# Patient Record
Sex: MALE | Race: OTHER | HISPANIC OR LATINO | Employment: FULL TIME | URBAN - METROPOLITAN AREA
[De-identification: names, ages, dates, MRNs, and addresses within clinical notes are randomized per-mention and may not be internally consistent; named-entity substitution may affect disease eponyms.]

---

## 2019-03-28 ENCOUNTER — HISTORICAL (OUTPATIENT)
Dept: ADMINISTRATIVE | Facility: HOSPITAL | Age: 64
End: 2019-03-28

## 2020-01-09 ENCOUNTER — OFFICE VISIT (OUTPATIENT)
Dept: FAMILY MEDICINE | Facility: CLINIC | Age: 65
End: 2020-01-09
Payer: MEDICAID

## 2020-01-09 VITALS
WEIGHT: 188 LBS | OXYGEN SATURATION: 98 % | HEIGHT: 65 IN | RESPIRATION RATE: 16 BRPM | SYSTOLIC BLOOD PRESSURE: 138 MMHG | DIASTOLIC BLOOD PRESSURE: 80 MMHG | BODY MASS INDEX: 31.32 KG/M2 | HEART RATE: 82 BPM

## 2020-01-09 DIAGNOSIS — G90.513 REFLEX SYMPATHETIC DYSTROPHY OF BOTH UPPER EXTREMITIES: ICD-10-CM

## 2020-01-09 DIAGNOSIS — B35.4 TINEA CORPORIS: ICD-10-CM

## 2020-01-09 DIAGNOSIS — I10 ESSENTIAL HYPERTENSION: ICD-10-CM

## 2020-01-09 DIAGNOSIS — Z76.89 ENCOUNTER TO ESTABLISH CARE: Primary | ICD-10-CM

## 2020-01-09 DIAGNOSIS — G90.523 REFLEX SYMPATHETIC DYSTROPHY OF BOTH LOWER EXTREMITIES: ICD-10-CM

## 2020-01-09 DIAGNOSIS — R60.9 EDEMA, UNSPECIFIED TYPE: ICD-10-CM

## 2020-01-09 DIAGNOSIS — K21.9 GASTROESOPHAGEAL REFLUX DISEASE, ESOPHAGITIS PRESENCE NOT SPECIFIED: ICD-10-CM

## 2020-01-09 PROCEDURE — 99204 PR OFFICE/OUTPT VISIT, NEW, LEVL IV, 45-59 MIN: ICD-10-PCS | Mod: S$GLB,,, | Performed by: NURSE PRACTITIONER

## 2020-01-09 PROCEDURE — 99204 OFFICE O/P NEW MOD 45 MIN: CPT | Mod: S$GLB,,, | Performed by: NURSE PRACTITIONER

## 2020-01-09 RX ORDER — MELOXICAM 15 MG/1
15 TABLET ORAL DAILY
Qty: 30 TABLET | Refills: 2 | Status: SHIPPED | OUTPATIENT
Start: 2020-01-09 | End: 2020-01-09 | Stop reason: ALTCHOICE

## 2020-01-09 RX ORDER — AMLODIPINE BESYLATE 5 MG/1
5 TABLET ORAL DAILY
COMMUNITY
End: 2020-06-05

## 2020-01-09 RX ORDER — LACTULOSE 10 G/15ML
10 SOLUTION ORAL; RECTAL
COMMUNITY

## 2020-01-09 RX ORDER — PANTOPRAZOLE SODIUM 40 MG/1
40 TABLET, DELAYED RELEASE ORAL DAILY
COMMUNITY
End: 2020-02-11 | Stop reason: SDUPTHER

## 2020-01-09 RX ORDER — OXYBUTYNIN CHLORIDE 5 MG/1
5 TABLET ORAL 2 TIMES DAILY
COMMUNITY
End: 2020-01-09 | Stop reason: ALTCHOICE

## 2020-01-09 RX ORDER — MELOXICAM 7.5 MG/1
7.5 TABLET ORAL DAILY
Qty: 30 TABLET | Refills: 2 | Status: SHIPPED | OUTPATIENT
Start: 2020-01-09 | End: 2020-02-05

## 2020-01-09 RX ORDER — SPIRONOLACTONE 25 MG/1
25 TABLET ORAL DAILY
COMMUNITY
End: 2020-04-27 | Stop reason: ALTCHOICE

## 2020-01-09 RX ORDER — CLOTRIMAZOLE AND BETAMETHASONE DIPROPIONATE 10; .64 MG/G; MG/G
45 CREAM TOPICAL 2 TIMES DAILY
COMMUNITY

## 2020-01-09 RX ORDER — KETOCONAZOLE 20 MG/ML
SHAMPOO, SUSPENSION TOPICAL
COMMUNITY

## 2020-01-09 RX ORDER — MIRABEGRON 25 MG/1
25 TABLET, FILM COATED, EXTENDED RELEASE ORAL DAILY
COMMUNITY
Start: 2019-12-31

## 2020-01-09 RX ORDER — TERBINAFINE HYDROCHLORIDE 250 MG/1
250 TABLET ORAL DAILY
Qty: 7 TABLET | Refills: 0 | Status: SHIPPED | OUTPATIENT
Start: 2020-01-09 | End: 2020-01-16 | Stop reason: SDUPTHER

## 2020-01-09 NOTE — PROGRESS NOTES
"Clinic Note  1/9/2020      Subjective:       Patient ID:  Nile is a 64 y.o. male being seen in office today to establish care.       Chief Complaint: Establish Care    Mr. Arana is here today to establish care with a new PCP. He was previously seen at [facility] by Dr. Delaney. He is unhappy with care received and would like a new provider. Last wellness lab work 6 months ago, will request these medical records.  He has a hx of HTN, GERD, reflex sympathetic dystrophy, lower extremity swelling, overactive bladder, and persistent rash.     HTN-B/P WNL in office today; well controlled on current regimen of Amlodipine 5mg.    GERD-well controlled on current regimen of Protonix 40mg. Had EGD and colonoscopy last year. Patient does not recall physician who performed procedures but was instructed to return in 10 years    RSD Syndrome-patient states he was diagnosed with this condition by a pain management doctor years ago. The condition affects both bilateral upper and lower extremities, including knee joints. He states it causes constant shooting pain between his extremities and it is debilitating. PT was recommended by pain management that he could not complete due to pain. He was sent to neuro by pain management and was told there was nothing they could do. He had a trial of Gabapentin that he states "didn't help at all".     Edema-reports bilateral ankle swelling daily that is relieved when elevating legs. Currently on Spironolactone 25mg daily.     Overactive bladder-states he pees all night long. He denies difficulty getting stream started, slow or weak   stream. He is currently on Myrbetriq and Ditropan, however he says the Ditropan does nothing for him. Dr. Madera, urologist, is on the Ditropan RX bottle but the patient has no recollection of ever seeing Urology. He is unsure of last PSA value; will obtain records.     Rash-reports rash to upper extremities x 6 months that itches. He was treated with " ketaconozole shampoo and Lotrisone cream by Dr. Delaney with no relief. He denies changing detergents, any contact with skin irritants, or exposure to cats, or other with similar rash.       Family History   Problem Relation Age of Onset    Cancer Mother     Cancer Father     Cancer Brother      Social History     Socioeconomic History    Marital status: Single     Spouse name: Not on file    Number of children: Not on file    Years of education: Not on file    Highest education level: Not on file   Occupational History    Not on file   Social Needs    Financial resource strain: Not on file    Food insecurity:     Worry: Not on file     Inability: Not on file    Transportation needs:     Medical: Not on file     Non-medical: Not on file   Tobacco Use    Smoking status: Former Smoker     Packs/day: 3.00     Years: 10.00     Pack years: 30.00   Substance and Sexual Activity    Alcohol use: Never     Frequency: Never    Drug use: Never    Sexual activity: Not on file   Lifestyle    Physical activity:     Days per week: Not on file     Minutes per session: Not on file    Stress: Not on file   Relationships    Social connections:     Talks on phone: Not on file     Gets together: Not on file     Attends Protestant service: Not on file     Active member of club or organization: Not on file     Attends meetings of clubs or organizations: Not on file     Relationship status: Not on file   Other Topics Concern    Not on file   Social History Narrative    Not on file     Past Surgical History:   Procedure Laterality Date    COLONOSCOPY      ESOPHAGOGASTRODUODENOSCOPY       Social History     Substance and Sexual Activity   Alcohol Use Never    Frequency: Never     Patient has no known allergies.  Medication List with Changes/Refills   New Medications    MELOXICAM (MOBIC) 7.5 MG TABLET    Take 1 tablet (7.5 mg total) by mouth once daily. Avoid all other NSAIDS while taking    TERBINAFINE HCL (LAMISIL) 250 MG  "TABLET    Take 1 tablet (250 mg total) by mouth once daily. for 7 days   Current Medications    AMLODIPINE (NORVASC) 5 MG TABLET    Take 5 mg by mouth once daily.    CLOTRIMAZOLE-BETAMETHASONE 1-0.05% (LOTRISONE) CREAM    Apply 45 g topically 2 (two) times daily.    KETOCONAZOLE (NIZORAL) 2 % SHAMPOO    Apply topically twice a week. 3 times weekly    LACTULOSE (CHRONULAC) 10 GRAM/15 ML SOLUTION    Take 10 g by mouth. 30ml per day    MYRBETRIQ 25 MG TB24 ER TABLET    Take 50 mg by mouth Daily.    PANTOPRAZOLE (PROTONIX) 40 MG TABLET    Take 40 mg by mouth once daily.    SPIRONOLACTONE (ALDACTONE) 25 MG TABLET    Take 25 mg by mouth once daily.   Discontinued Medications    OXYBUTYNIN (DITROPAN) 5 MG TAB    Take 5 mg by mouth 2 (two) times daily.       Review of Systems   Constitutional: Negative for chills, fever and weight loss.   HENT: Negative for congestion, sinus pain and sore throat.    Eyes: Negative for photophobia.   Respiratory: Negative for cough, shortness of breath and wheezing.    Cardiovascular: Positive for leg swelling (ankles). Negative for chest pain and palpitations.   Gastrointestinal: Negative for abdominal pain, blood in stool, constipation, diarrhea, heartburn, nausea and vomiting.   Genitourinary: Positive for frequency (at night). Negative for dysuria, hematuria and urgency.   Musculoskeletal: Positive for joint pain and myalgias. Negative for back pain, falls and neck pain.   Skin: Positive for itching and rash.   Neurological: Negative for dizziness, seizures, loss of consciousness, weakness and headaches.   Psychiatric/Behavioral: Negative for depression, memory loss and suicidal ideas. The patient is not nervous/anxious.              /80 (BP Location: Left arm, Patient Position: Sitting, BP Method: Large (Manual))   Pulse 82   Resp 16   Ht 5' 5" (1.651 m)   Wt 85.3 kg (188 lb)   SpO2 98%   BMI 31.28 kg/m²   Estimated body mass index is 31.28 kg/m² as calculated from the " "following:    Height as of this encounter: 5' 5" (1.651 m).    Weight as of this encounter: 85.3 kg (188 lb).    Objective:        Physical Exam   Constitutional: He is oriented to person, place, and time and well-developed, well-nourished, and in no distress.   HENT:   Head: Normocephalic and atraumatic.   Eyes: Pupils are equal, round, and reactive to light. Conjunctivae and EOM are normal.   Neck: Normal range of motion. Neck supple. No JVD present.   Cardiovascular: Normal rate, regular rhythm, normal heart sounds and intact distal pulses. Exam reveals no gallop and no friction rub.   No murmur heard.  Pulmonary/Chest: Effort normal and breath sounds normal. No respiratory distress. He has no wheezes. He has no rales. He exhibits no tenderness.   Abdominal: Soft. Normal aorta and bowel sounds are normal. He exhibits no distension. There is no tenderness. There is no CVA tenderness.   Small umbilical hernia noted   Musculoskeletal: Normal range of motion. He exhibits no edema, tenderness or deformity.   Lymphadenopathy:     He has no cervical adenopathy.   Neurological: He is alert and oriented to person, place, and time. He has intact cranial nerves. He displays abnormal stance. He displays no weakness, facial symmetry, normal speech and normal reflexes. No cranial nerve deficit or sensory deficit. He exhibits normal muscle tone. Gait abnormal. Coordination normal.   Skin: Skin is warm and dry. Rash noted.        Annular erythematous scaly plaques noted to bilateral upper extremities   Psychiatric: Mood, memory, affect and judgment normal.   Nursing note and vitals reviewed.        Assessment and Plan:         Ever was seen today for establish care.    Diagnoses and all orders for this visit:    Encounter to establish care  Comments:  Request for all previous medical records    Reflex sympathetic dystrophy of both lower extremities  -     Discontinue: meloxicam (MOBIC) 15 MG tablet; Take 1 tablet (15 mg total) " "by mouth once daily.  -     meloxicam (MOBIC) 7.5 MG tablet; Take 1 tablet (7.5 mg total) by mouth once daily. Avoid all other NSAIDS while taking    Reflex sympathetic dystrophy of both upper extremities  -     Discontinue: meloxicam (MOBIC) 15 MG tablet; Take 1 tablet (15 mg total) by mouth once daily.  -     meloxicam (MOBIC) 7.5 MG tablet; Take 1 tablet (7.5 mg total) by mouth once daily. Avoid all other NSAIDS while taking    Essential hypertension  Comments:  Continue Almodipine 5mg daily    Gastroesophageal reflux disease, esophagitis presence not specified  Comments:  Continue Protonix 40mg    Tinea corporis  Comments:  Benadryl PRN for itching  Continue Ketoconazole shampoo to rash and lotrisone cream as previously prescribed  Orders:  -     terbinafine HCl (LAMISIL) 250 mg tablet; Take 1 tablet (250 mg total) by mouth once daily. for 7 days    Edema, unspecified type  Comments:  Continue Spironolactone 25mg daily     Patient is requesting medication for "arthritis" since pain management told him at one time this condition could be treated in that manner. Trial of Mobic. Instructed patient to avoid all other NSAIDs while on Mobic. Patient verbalized understanding and agreed to treatment and plan of care.       Follow up:   Follow up in about 3 months (around 4/9/2020), or sooner if needed.            Carol Marie NP     "

## 2020-01-14 ENCOUNTER — TELEPHONE (OUTPATIENT)
Dept: FAMILY MEDICINE | Facility: CLINIC | Age: 65
End: 2020-01-14

## 2020-01-14 DIAGNOSIS — G90.513 REFLEX SYMPATHETIC DYSTROPHY OF BOTH UPPER EXTREMITIES: ICD-10-CM

## 2020-01-14 DIAGNOSIS — G90.523 REFLEX SYMPATHETIC DYSTROPHY OF BOTH LOWER EXTREMITIES: ICD-10-CM

## 2020-01-16 DIAGNOSIS — B35.4 TINEA CORPORIS: ICD-10-CM

## 2020-01-16 RX ORDER — TERBINAFINE HYDROCHLORIDE 250 MG/1
250 TABLET ORAL DAILY
Qty: 7 TABLET | Refills: 0 | Status: SHIPPED | OUTPATIENT
Start: 2020-01-16 | End: 2020-01-23

## 2020-01-23 ENCOUNTER — TELEPHONE (OUTPATIENT)
Dept: FAMILY MEDICINE | Facility: CLINIC | Age: 65
End: 2020-01-23

## 2020-01-23 NOTE — TELEPHONE ENCOUNTER
Pt called to state the terbinafine is not working for his skin. He is still scratching till he bleeds. Please advise. He has tried benadryl.       Pt also states that he meloxican is not working. He states he is having a hard time getting up from a sitting position. Once he stands it is hard to get his knees moving. He is asking what you would suggest.

## 2020-01-24 DIAGNOSIS — G90.513 REFLEX SYMPATHETIC DYSTROPHY OF BOTH UPPER EXTREMITIES: ICD-10-CM

## 2020-01-24 DIAGNOSIS — G90.523 REFLEX SYMPATHETIC DYSTROPHY OF BOTH LOWER EXTREMITIES: Primary | ICD-10-CM

## 2020-01-24 DIAGNOSIS — B35.4 TINEA CORPORIS: ICD-10-CM

## 2020-01-24 RX ORDER — PREGABALIN 75 MG/1
75 CAPSULE ORAL 2 TIMES DAILY
Qty: 60 CAPSULE | Refills: 0 | Status: SHIPPED | OUTPATIENT
Start: 2020-01-24 | End: 2020-06-05

## 2020-01-24 RX ORDER — DOXYLAMINE SUCCINATE 25 MG
TABLET ORAL 2 TIMES DAILY
Qty: 28 G | Refills: 0 | Status: SHIPPED | OUTPATIENT
Start: 2020-01-24 | End: 2020-02-27 | Stop reason: SDUPTHER

## 2020-01-24 RX ORDER — FLUCONAZOLE 150 MG/1
150 TABLET ORAL WEEKLY
Qty: 4 TABLET | Refills: 0 | Status: SHIPPED | OUTPATIENT
Start: 2020-01-24 | End: 2020-02-15

## 2020-01-28 ENCOUNTER — TELEPHONE (OUTPATIENT)
Dept: FAMILY MEDICINE | Facility: CLINIC | Age: 65
End: 2020-01-28

## 2020-02-05 DIAGNOSIS — G90.513 REFLEX SYMPATHETIC DYSTROPHY OF BOTH UPPER EXTREMITIES: ICD-10-CM

## 2020-02-05 DIAGNOSIS — G90.523 REFLEX SYMPATHETIC DYSTROPHY OF BOTH LOWER EXTREMITIES: Primary | ICD-10-CM

## 2020-02-05 RX ORDER — MELOXICAM 15 MG/1
15 TABLET ORAL DAILY
Qty: 30 TABLET | Refills: 2 | Status: SHIPPED | OUTPATIENT
Start: 2020-02-05 | End: 2020-02-26 | Stop reason: ALTCHOICE

## 2020-02-11 RX ORDER — PANTOPRAZOLE SODIUM 40 MG/1
40 TABLET, DELAYED RELEASE ORAL DAILY
Qty: 30 TABLET | Refills: 5 | Status: SHIPPED | OUTPATIENT
Start: 2020-02-11 | End: 2020-08-09

## 2020-02-20 RX ORDER — PANTOPRAZOLE SODIUM 40 MG/1
40 TABLET, DELAYED RELEASE ORAL DAILY
Qty: 30 TABLET | Refills: 5 | OUTPATIENT
Start: 2020-02-20 | End: 2020-08-18

## 2020-02-26 ENCOUNTER — TELEPHONE (OUTPATIENT)
Dept: FAMILY MEDICINE | Facility: CLINIC | Age: 65
End: 2020-02-26

## 2020-02-26 DIAGNOSIS — G90.513 REFLEX SYMPATHETIC DYSTROPHY OF BOTH UPPER EXTREMITIES: Primary | ICD-10-CM

## 2020-02-26 DIAGNOSIS — G90.523 REFLEX SYMPATHETIC DYSTROPHY OF BOTH LOWER EXTREMITIES: ICD-10-CM

## 2020-02-26 RX ORDER — DICLOFENAC SODIUM 10 MG/G
4 GEL TOPICAL 4 TIMES DAILY
Qty: 100 G | Refills: 1 | Status: SHIPPED | OUTPATIENT
Start: 2020-02-26 | End: 2020-05-19 | Stop reason: SDUPTHER

## 2020-02-26 NOTE — TELEPHONE ENCOUNTER
Pt called to advise he tried a topical Pennsaid 2% from a friend and it helped with his knees. He would like you to call that out to the pharmacy.     He is also c/o leg cramps and to his feet. I asked if he discussed this with you and he states he may have mentioned it but it is worse. Please advise.

## 2020-02-27 DIAGNOSIS — B35.4 TINEA CORPORIS: ICD-10-CM

## 2020-02-27 RX ORDER — DOXYLAMINE SUCCINATE 25 MG
TABLET ORAL 2 TIMES DAILY
Qty: 28 G | Refills: 0 | Status: SHIPPED | OUTPATIENT
Start: 2020-02-27 | End: 2020-04-20 | Stop reason: SDUPTHER

## 2020-04-16 DIAGNOSIS — G90.523 REFLEX SYMPATHETIC DYSTROPHY OF BOTH LOWER EXTREMITIES: Primary | ICD-10-CM

## 2020-04-16 DIAGNOSIS — G90.513 REFLEX SYMPATHETIC DYSTROPHY OF BOTH UPPER EXTREMITIES: ICD-10-CM

## 2020-04-16 RX ORDER — MELOXICAM 7.5 MG/1
7.5 TABLET ORAL DAILY
Qty: 30 TABLET | Refills: 2 | Status: SHIPPED | OUTPATIENT
Start: 2020-04-16 | End: 2020-06-05

## 2020-04-20 DIAGNOSIS — B35.4 TINEA CORPORIS: ICD-10-CM

## 2020-04-20 RX ORDER — DOXYLAMINE SUCCINATE 25 MG
TABLET ORAL 2 TIMES DAILY
Qty: 28 G | Refills: 0 | Status: SHIPPED | OUTPATIENT
Start: 2020-04-20 | End: 2020-05-19 | Stop reason: SDUPTHER

## 2020-04-27 ENCOUNTER — TELEPHONE (OUTPATIENT)
Dept: FAMILY MEDICINE | Facility: CLINIC | Age: 65
End: 2020-04-27

## 2020-04-27 ENCOUNTER — OFFICE VISIT (OUTPATIENT)
Dept: FAMILY MEDICINE | Facility: CLINIC | Age: 65
End: 2020-04-27
Payer: MEDICAID

## 2020-04-27 DIAGNOSIS — N50.89 SWELLING OF RIGHT TESTICLE: Primary | ICD-10-CM

## 2020-04-27 DIAGNOSIS — Z12.5 PROSTATE CANCER SCREENING: ICD-10-CM

## 2020-04-27 DIAGNOSIS — R39.15 URINARY URGENCY: ICD-10-CM

## 2020-04-27 DIAGNOSIS — R60.9 EDEMA, UNSPECIFIED TYPE: ICD-10-CM

## 2020-04-27 DIAGNOSIS — Z79.899 LONG TERM CURRENT USE OF THERAPEUTIC DRUG: ICD-10-CM

## 2020-04-27 DIAGNOSIS — I10 ESSENTIAL HYPERTENSION: ICD-10-CM

## 2020-04-27 DIAGNOSIS — G90.513 REFLEX SYMPATHETIC DYSTROPHY OF BOTH UPPER EXTREMITIES: ICD-10-CM

## 2020-04-27 DIAGNOSIS — Z87.438 HISTORY OF BPH: ICD-10-CM

## 2020-04-27 DIAGNOSIS — G90.523 REFLEX SYMPATHETIC DYSTROPHY OF BOTH LOWER EXTREMITIES: ICD-10-CM

## 2020-04-27 PROBLEM — Z76.89 ENCOUNTER TO ESTABLISH CARE: Status: RESOLVED | Noted: 2020-01-09 | Resolved: 2020-04-27

## 2020-04-27 PROCEDURE — 99443 PR PHYSICIAN TELEPHONE EVALUATION 21-30 MIN: CPT | Mod: 95,,, | Performed by: NURSE PRACTITIONER

## 2020-04-27 PROCEDURE — 99443 PR PHYSICIAN TELEPHONE EVALUATION 21-30 MIN: ICD-10-PCS | Mod: 95,,, | Performed by: NURSE PRACTITIONER

## 2020-04-27 RX ORDER — HYDROCHLOROTHIAZIDE 12.5 MG/1
12.5 TABLET ORAL DAILY
Qty: 30 TABLET | Refills: 11 | Status: SHIPPED | OUTPATIENT
Start: 2020-04-27 | End: 2020-04-30 | Stop reason: ALTCHOICE

## 2020-04-27 NOTE — PROGRESS NOTES
Established Patient - Audio Only Telehealth Visit     The patient location is: Home  The chief complaint leading to consultation is: testicle swelling, urinary urgency  Visit type: Virtual visit with audio only (telephone) 22 minutes     The reason for the audio only service rather than synchronous audio and video virtual visit was related to technical difficulties or patient preference/necessity.     Each patient to whom I provide medical services by telemedicine is:  (1) informed of the relationship between the physician and patient and the respective role of any other health care provider with respect to management of the patient; and (2) notified that they may decline to receive medical services by telemedicine and may withdraw from such care at any time. Patient verbally consented to receive this service via voice-only telephone call.         This service was not originating from a related E/M service provided within the previous 7 days nor will  to an E/M service or procedure within the next 24 hours or my soonest available appointment.  Prevailing standard of care was able to be met in this audio-only visit.      Clinic Note  4/27/2020      Subjective:       Patient ID:  Nile is a 65 y.o. male being seen in office today as an established patient.     Chief Complaint: Follow-up (testicle swelling pain x 2 years)    Mr. Arana is a an audio visit due to COVID 19 and his inability to utilize a smart phone.  He was previously seen at [facility] and has a hx of BPH, HTN, GERD, reflex sympathetic dystrophy, lower extremity swelling, and overactive bladder. After reviewing his medical records received from [facility] it also appears he has a hx of hepatic encephalopathy and alcohol induced cirrhosis of the liver with esophageal varices which he did not disclose. He denies currently drinking.     Overactive bladder-states he pees all night long despite taking fluid pill in a.m.. He denies difficulty  "getting stream started, slow or weak stream. He is unsure of last PSA value; did not appear to be done in previous labs obtained. He has been on Mybetriq for 1+ years but pt states he still has urinary urgency daily. He also complains of swelling to his right testicle x 2 years with some pain. He reports having an ultrasound done at [place] around 7-8 months ago and was told it was "fluid"; these records were not received. Today he would like a different fluid pill besides the Aldactone, he feels it no longer works.     HTN-compliant with and reports well controlled on current regimen of Amlodipine 5mg    GERD-well controlled on current regimen of Protonix 40mg. Had EGD and colonoscopy last year. Patient does not recall physician who performed procedures but was instructed to return in 10 years    RSD Syndrome-patient states he was diagnosed with this condition by a pain management doctor years ago. The condition affects both bilateral upper and lower extremities, including knee joints. He states it causes constant shooting pain between his extremities and it is debilitating. PT was recommended by pain management that he could not complete due to pain. He was sent to neuro by pain management and was told there was nothing they could do. He had a trial of Gabapentin that he states "didn't help at all". Also has failed Mobic and cannot afford trial of Lyrica.    Family History   Problem Relation Age of Onset    Cancer Mother     Cancer Father     Cancer Brother      Past Surgical History:   Procedure Laterality Date    COLONOSCOPY      ESOPHAGOGASTRODUODENOSCOPY       Social History     Substance and Sexual Activity   Alcohol Use Never    Frequency: Never     Patient has no known allergies.  Medication List with Changes/Refills   New Medications    HYDROCHLOROTHIAZIDE (HYDRODIURIL) 12.5 MG TAB    Take 1 tablet (12.5 mg total) by mouth once daily.   Current Medications    AMLODIPINE (NORVASC) 5 MG TABLET    Take 5 " mg by mouth once daily.    CLOTRIMAZOLE-BETAMETHASONE 1-0.05% (LOTRISONE) CREAM    Apply 45 g topically 2 (two) times daily.    DICLOFENAC SODIUM (VOLTAREN) 1 % GEL    Apply 4 g topically 4 (four) times daily.    KETOCONAZOLE (NIZORAL) 2 % SHAMPOO    Apply topically twice a week. 3 times weekly    LACTULOSE (CHRONULAC) 10 GRAM/15 ML SOLUTION    Take 10 g by mouth. 30ml per day    MELOXICAM (MOBIC) 7.5 MG TABLET    Take 1 tablet (7.5 mg total) by mouth once daily. Avoid all other NSAIDS while taking    MICONAZOLE (ANTIFUNGAL CREAM, MICONAZOLE,) 2 % CREAM    Apply topically 2 (two) times daily.    MYRBETRIQ 25 MG TB24 ER TABLET    Take 25 mg by mouth Daily. Take 2 tabs (50mg total) by mouth once daily.    PANTOPRAZOLE (PROTONIX) 40 MG TABLET    Take 1 tablet (40 mg total) by mouth once daily.    PREGABALIN (LYRICA) 75 MG CAPSULE    Take 1 capsule (75 mg total) by mouth 2 (two) times daily.   Discontinued Medications    SPIRONOLACTONE (ALDACTONE) 25 MG TABLET    Take 25 mg by mouth once daily.       Review of Systems   Constitutional: Negative for appetite change, diaphoresis, fatigue and fever.   HENT: Negative for congestion, ear pain, postnasal drip, rhinorrhea, sinus pressure, sinus pain and sore throat.    Respiratory: Negative for cough, shortness of breath, wheezing and stridor.    Cardiovascular: Positive for leg swelling. Negative for chest pain and palpitations.   Gastrointestinal: Negative for abdominal pain, blood in stool, constipation, diarrhea, nausea and vomiting.   Genitourinary: Positive for frequency and urgency. Negative for flank pain and hematuria.   Musculoskeletal: Positive for arthralgias, joint swelling and myalgias. Negative for gait problem.   Skin: Negative for color change and rash.   Neurological: Negative for dizziness, tremors, seizures, speech difficulty, weakness, numbness and headaches.   Psychiatric/Behavioral: Negative for confusion, decreased concentration, hallucinations and  "sleep disturbance. The patient is not nervous/anxious.               There were no vitals taken for this visit.  Estimated body mass index is 31.28 kg/m² as calculated from the following:    Height as of 1/9/20: 5' 5" (1.651 m).    Weight as of 1/9/20: 85.3 kg (188 lb).    Objective:        Physical Exam   Constitutional: He is oriented to person, place, and time.   Neurological: He is alert and oriented to person, place, and time.   Psychiatric: He has a normal mood and affect. His speech is normal and behavior is normal. Judgment and thought content normal. Cognition and memory are normal.    Limited physical exam due to audio visit      Assessment and Plan:         Ever was seen today for follow-up.    Diagnoses and all orders for this visit:    Swelling of right testicle  -     hydroCHLOROthiazide (HYDRODIURIL) 12.5 MG Tab; Take 1 tablet (12.5 mg total) by mouth once daily.  -     CBC auto differential; Future  -     Comprehensive metabolic panel; Future  -     PSA, Screening; Future  -     Ambulatory referral/consult to Urology; Future  -     CBC auto differential  -     Comprehensive metabolic panel  -     PSA, Screening    History of BPH  -     PSA, Screening; Future  -     Urinalysis, Complete with Reflex To Culture; Future  -     Ambulatory referral/consult to Urology; Future  -     PSA, Screening  -     Urinalysis, Complete with Reflex To Culture    Urinary urgency  -     Urinalysis, Complete with Reflex To Culture; Future  -     Ambulatory referral/consult to Urology; Future  -     Urinalysis, Complete with Reflex To Culture    Edema, unspecified type  -     CBC auto differential; Future  -     Comprehensive metabolic panel; Future  -     CBC auto differential  -     Comprehensive metabolic panel    Essential hypertension  -     Lipid panel; Future  -     Hemoglobin A1c; Future  -     Lipid panel  -     Hemoglobin A1c    Reflex sympathetic dystrophy of both lower extremities    Reflex sympathetic " dystrophy of both upper extremities    Prostate cancer screening  -     PSA, Screening; Future  -     PSA, Screening    Long term current use of therapeutic drug  -     CBC auto differential; Future  -     Comprehensive metabolic panel; Future  -     Lipid panel; Future  -     Hemoglobin A1c; Future  -     CBC auto differential  -     Comprehensive metabolic panel  -     Lipid panel  -     Hemoglobin A1c    Discussed need for ultrasound of testicle and to reestablish with urology. Pt was informed if they have not received a phone call from provider referred to in one week to call office so that we can follow-up. ER precautions given for testicle pain.  Trial of HCTZ instead of Aldactone for edema of lower extremities and testicle.  Pt was informed if they have not heard from us in 5 business days after having blood drawn or images performed to notify us so we can ensure results were received.   Patient verbalized understanding and agreed to treatment and plan of care.      Follow up:   Follow up in about 3 months (around 7/27/2020), or sooner if needed.            Carol Marie, JAY

## 2020-04-27 NOTE — TELEPHONE ENCOUNTER
Please see message below.         ----- Message from Rosy Kendrick sent at 4/27/2020  8:36 AM CDT -----  Contact: self [phone]  Type:  Needs Medical Advice    Who Called: Ever Arana   Symptoms (please be specific): urine frequency and urgency, testicle swelling, pain, leg pain   How long has patient had these symptoms:  About 2 years   Pharmacy name and phone #:      [id number] - [place], [place] - [address] AT [place] [address]  [address]  [place] [address]  Phone: [phone] Fax: [phone]    Would the patient rather a call back or a response via [facility]? Call back  Best Call Back Number: [phone]  Additional Information:  States that the symptoms is getting worse over the last few days.

## 2020-04-28 LAB
ABS NRBC COUNT: 0 X 10 3/UL (ref 0–0.01)
ABSOLUTE BASOPHIL: 0.05 X 10 3/UL (ref 0–0.22)
ABSOLUTE EOSINOPHIL: 0.2 X 10 3/UL (ref 0.04–0.54)
ABSOLUTE IMMATURE GRAN: 0.03 X 10 3/UL (ref 0–0.04)
ABSOLUTE LYMPHOCYTE: 1.32 X 10 3/UL (ref 0.86–4.75)
ABSOLUTE MONOCYTE: 0.53 X 10 3/UL (ref 0.22–1.08)
ALBUMIN SERPL-MCNC: 3.9 G/DL (ref 3.5–5.2)
ALBUMIN/GLOB SERPL ELPH: 1.1 {RATIO} (ref 1–2.7)
ALP ISOS SERPL LEV INH-CCNC: 177 U/L (ref 40–130)
ALT (SGPT): 21 U/L (ref 0–41)
ANION GAP SERPL CALC-SCNC: 13 MMOL/L (ref 8–17)
AST SERPL-CCNC: 40 U/L (ref 0–40)
BASOPHILS NFR BLD: 0.9 % (ref 0.2–1.2)
BILIRUBIN, TOTAL: 0.92 MG/DL (ref 0–1.2)
BUN/CREAT SERPL: 11.8 (ref 6–20)
CALCIUM SERPL-MCNC: 9.2 MG/DL (ref 8.6–10.2)
CARBON DIOXIDE, CO2: 21 MMOL/L (ref 22–29)
CHLORIDE: 106 MMOL/L (ref 98–107)
CHOLEST SERPL-MSCNC: 141 MG/DL (ref 100–200)
CREAT SERPL-MCNC: 0.92 MG/DL (ref 0.7–1.2)
EOSINOPHIL NFR BLD: 3.5 % (ref 0.7–7)
ESTIMATED AVERAGE GLUCOSE: 98 MG/DL
GFR ESTIMATION: 82.83
GLOBULIN: 3.6 G/DL (ref 1.5–4.5)
GLUCOSE: 97 MG/DL (ref 82–115)
HBA1C MFR BLD: 5.1 % (ref 4–6)
HCT VFR BLD AUTO: 35.4 % (ref 42–52)
HDLC SERPL-MCNC: 47 MG/DL
HGB BLD-MCNC: 11.4 G/DL (ref 14–18)
IMMATURE GRANULOCYTES: 0.5 % (ref 0–0.5)
LDL/HDL RATIO: 1.7 (ref 1–3)
LDLC SERPL CALC-MCNC: 82 MG/DL (ref 0–100)
LYMPHOCYTES NFR BLD: 22.8 % (ref 19.3–53.1)
MCH RBC QN AUTO: 28.2 PG (ref 27–32)
MCHC RBC AUTO-ENTMCNC: 32.2 G/DL (ref 32–36)
MCV RBC AUTO: 87.6 FL (ref 80–94)
MONOCYTES NFR BLD: 9.2 % (ref 4.7–12.5)
NEUTROPHILS ABSOLUTE COUNT: 3.66 X 10 3/UL (ref 2.15–7.56)
NEUTROPHILS NFR BLD: 63.1 % (ref 34–71.1)
NUCLEATED RED BLOOD CELLS: 0 /100 WBC (ref 0–0.2)
PLATELET # BLD AUTO: 144 X 10 3/UL (ref 135–400)
POTASSIUM: 4.4 MMOL/L (ref 3.5–5.1)
PROT SNV-MCNC: 7.5 G/DL (ref 6.4–8.3)
PSA, DIAGNOSTIC: 0.24 NG/ML (ref 0–4)
RBC # BLD AUTO: 4.04 X 10 6/UL (ref 4.7–6.1)
RDW-SD: 51.1 FL (ref 37–54)
SODIUM: 140 MMOL/L (ref 136–145)
TRIGL SERPL-MCNC: 60 MG/DL (ref 0–150)
UREA NITROGEN (BUN): 10.9 MG/DL (ref 8–23)
WBC # BLD: 5.79 X 10 3/UL (ref 4.3–10.8)

## 2020-04-30 ENCOUNTER — TELEPHONE (OUTPATIENT)
Dept: UROLOGY | Facility: CLINIC | Age: 65
End: 2020-04-30

## 2020-04-30 ENCOUNTER — OFFICE VISIT (OUTPATIENT)
Dept: UROLOGY | Facility: CLINIC | Age: 65
End: 2020-04-30
Payer: MEDICAID

## 2020-04-30 ENCOUNTER — TELEPHONE (OUTPATIENT)
Dept: FAMILY MEDICINE | Facility: CLINIC | Age: 65
End: 2020-04-30

## 2020-04-30 VITALS
SYSTOLIC BLOOD PRESSURE: 149 MMHG | DIASTOLIC BLOOD PRESSURE: 71 MMHG | WEIGHT: 185 LBS | HEIGHT: 66 IN | HEART RATE: 73 BPM | RESPIRATION RATE: 18 BRPM | BODY MASS INDEX: 29.73 KG/M2

## 2020-04-30 DIAGNOSIS — Z87.438 HISTORY OF BPH: ICD-10-CM

## 2020-04-30 DIAGNOSIS — N50.89 SWELLING OF RIGHT TESTICLE: ICD-10-CM

## 2020-04-30 DIAGNOSIS — R39.15 URINARY URGENCY: ICD-10-CM

## 2020-04-30 DIAGNOSIS — N52.9 ERECTILE DYSFUNCTION, UNSPECIFIED ERECTILE DYSFUNCTION TYPE: Primary | ICD-10-CM

## 2020-04-30 LAB
BILIRUB UR QL STRIP: NEGATIVE
E2: 54.4 PG/ML
FREE TESTOSTERONE: 9.38 NG/DL (ref 2–22)
FSH: 4.97 MIU/ML
GLUCOSE UR QL STRIP: NEGATIVE
HCT VFR BLD AUTO: 38.3 % (ref 42–52)
HGB BLD-MCNC: 12.3 G/DL (ref 14–18)
KETONES UR QL STRIP: NEGATIVE
LEUKOCYTE ESTERASE UR QL STRIP: NEGATIVE
PH, POC UA: 5.5
POC AMORP, URINE: ABNORMAL
POC BACTI, URINE: ABNORMAL
POC BLOOD, URINE: POSITIVE
POC CASTS, URINE: ABNORMAL
POC CRYST, URINE: ABNORMAL
POC EPITH, URINE: ABNORMAL
POC HCG, URINE: ABNORMAL
POC HYALIN, URINE: ABNORMAL LPF
POC MUCUS, URINE: ABNORMAL
POC NITRATES, URINE: NEGATIVE
POC OTHER, URINE: ABNORMAL
POC RBC, URINE: ABNORMAL HPF
POC RESIDUAL URINE VOLUME: 13 ML (ref 0–100)
POC WBC, URINE: ABNORMAL HPF
PROT UR QL STRIP: NEGATIVE
PSA, DIAGNOSTIC: 0.29 NG/ML (ref 0–4)
SHBG: 79.4 NMOL/L (ref 19.3–76.4)
SP GR UR STRIP: 1.02 (ref 1–1.03)
UROBILINOGEN UR STRIP-ACNC: 1 (ref 0.3–2.2)

## 2020-04-30 PROCEDURE — 99214 OFFICE O/P EST MOD 30 MIN: CPT | Mod: 25,S$GLB,, | Performed by: NURSE PRACTITIONER

## 2020-04-30 PROCEDURE — 99214 PR OFFICE/OUTPT VISIT, EST, LEVL IV, 30-39 MIN: ICD-10-PCS | Mod: 25,S$GLB,, | Performed by: NURSE PRACTITIONER

## 2020-04-30 PROCEDURE — 51798 US URINE CAPACITY MEASURE: CPT | Mod: S$GLB,,, | Performed by: NURSE PRACTITIONER

## 2020-04-30 PROCEDURE — 51798 POCT BLADDER SCAN: ICD-10-PCS | Mod: S$GLB,,, | Performed by: NURSE PRACTITIONER

## 2020-04-30 RX ORDER — HYDROCHLOROTHIAZIDE 12.5 MG/1
CAPSULE ORAL
COMMUNITY

## 2020-04-30 NOTE — PROGRESS NOTES
Subjective:       Patient ID: Ever Arana is a 65 y.o. male.    Chief Complaint: Establish Care (FMS transfer) and Testicular Swelling (R)      HPI: 65-year-old male new patient to the service of Dr. Madera, past patient of Dr. Curtis seen today for complaint of right scrotal swelling.  Symptoms have been ongoin for several months.  He reports that the swelling gets larger and smaller.  Patient is reporting pain with normal daily activities to include walking, sitting.  His PCP wrote for hydrochlorothiazide which the patient has not taken.  He also complains of erectile dysfunction.  This has been ongoing for over 2 years.  He gets semi rigid erections to soften penetration.  This is bothersome to him as he is in a relationship been unable to perform.  Last prostate screen was January 2019, EDDIE recorded as benign and PSA was 0.33.  Voiding without difficulty good stream, easy to start, denies dysuria, frequency, urgency, incontinence or gross hematuria       Past Medical History:   Past Medical History:   Diagnosis Date    GERD (gastroesophageal reflux disease)     History of hematuria     Hypertension     Reflex sympathetic dystrophy of both lower extremities     Reflex sympathetic dystrophy of both lower extremities        Past Surgical Historical:   Past Surgical History:   Procedure Laterality Date    COLONOSCOPY      ESOPHAGOGASTRODUODENOSCOPY          Medications:   Medication List with Changes/Refills   Current Medications    AMLODIPINE (NORVASC) 5 MG TABLET    Take 5 mg by mouth once daily.    CLOTRIMAZOLE-BETAMETHASONE 1-0.05% (LOTRISONE) CREAM    Apply 45 g topically 2 (two) times daily.    DICLOFENAC SODIUM (VOLTAREN) 1 % GEL    Apply 4 g topically 4 (four) times daily.    HYDROCHLOROTHIAZIDE (HYDRODIURIL) 12.5 MG TAB    Take 1 tablet (12.5 mg total) by mouth once daily.    HYDROCHLOROTHIAZIDE (MICROZIDE) 12.5 MG CAPSULE    hydrochlorothiazide 12.5 mg capsule    KETOCONAZOLE (NIZORAL) 2 %  SHAMPOO    Apply topically twice a week. 3 times weekly    LACTULOSE (CHRONULAC) 10 GRAM/15 ML SOLUTION    Take 10 g by mouth. 30ml per day    MELOXICAM (MOBIC) 7.5 MG TABLET    Take 1 tablet (7.5 mg total) by mouth once daily. Avoid all other NSAIDS while taking    MICONAZOLE (ANTIFUNGAL CREAM, MICONAZOLE,) 2 % CREAM    Apply topically 2 (two) times daily.    MYRBETRIQ 25 MG TB24 ER TABLET    Take 25 mg by mouth Daily. Take 2 tabs (50mg total) by mouth once daily.    PANTOPRAZOLE (PROTONIX) 40 MG TABLET    Take 1 tablet (40 mg total) by mouth once daily.    PREGABALIN (LYRICA) 75 MG CAPSULE    Take 1 capsule (75 mg total) by mouth 2 (two) times daily.        Past Social History:   Social History     Socioeconomic History    Marital status: Single     Spouse name: Not on file    Number of children: Not on file    Years of education: Not on file    Highest education level: Not on file   Occupational History    Not on file   Social Needs    Financial resource strain: Not on file    Food insecurity:     Worry: Not on file     Inability: Not on file    Transportation needs:     Medical: Not on file     Non-medical: Not on file   Tobacco Use    Smoking status: Former Smoker     Packs/day: 3.00     Years: 10.00     Pack years: 30.00   Substance and Sexual Activity    Alcohol use: Never     Frequency: Never    Drug use: Never    Sexual activity: Not on file   Lifestyle    Physical activity:     Days per week: Not on file     Minutes per session: Not on file    Stress: Not on file   Relationships    Social connections:     Talks on phone: Not on file     Gets together: Not on file     Attends Alevism service: Not on file     Active member of club or organization: Not on file     Attends meetings of clubs or organizations: Not on file     Relationship status: Not on file   Other Topics Concern    Not on file   Social History Narrative    Not on file       Allergies: Review of patient's allergies  indicates:  No Known Allergies     Family History:   Family History   Problem Relation Age of Onset    Cancer Mother     Cancer Father     Cancer Brother         Review of Systems:  Review of Systems   Constitutional: Negative for activity change and appetite change.   HENT: Negative for congestion and dental problem.    Eyes: Negative for visual disturbance.   Respiratory: Negative for chest tightness and shortness of breath.    Cardiovascular: Negative for chest pain.   Gastrointestinal: Negative for abdominal distention and abdominal pain.   Genitourinary: Negative for decreased urine volume, difficulty urinating, discharge, dysuria, enuresis, flank pain, frequency, genital sores, hematuria, penile pain, penile swelling, scrotal swelling, testicular pain and urgency.   Musculoskeletal: Negative for back pain and neck pain.   Skin: Negative for color change.   Neurological: Negative for dizziness.   Hematological: Negative for adenopathy.   Psychiatric/Behavioral: Negative for agitation, behavioral problems and confusion.       Physical Exam:  Physical Exam   Constitutional: He is oriented to person, place, and time. He appears well-developed and well-nourished.   HENT:   Head: Normocephalic.   Eyes: No scleral icterus.   Neck: Normal range of motion.   Cardiovascular: Intact distal pulses.    Pulmonary/Chest: Effort normal and breath sounds normal.   Abdominal: Soft. He exhibits no distension. There is no tenderness. No hernia. Hernia confirmed negative in the right inguinal area and confirmed negative in the left inguinal area.   Genitourinary: Prostate normal and penis normal. Cremasteric reflex is present. Right testis shows swelling ( right hemiscrotum, large) and tenderness ( right hemiscrotum/right inguinal).   Genitourinary Comments: EDDIE-smooth, symmetrical benign-feeling   Neurological: He is alert and oriented to person, place, and time.   Skin: Skin is warm and dry.     Psychiatric: He has a normal  mood and affect.       Assessment/Plan:   Right hydrocele--appears to be communicating, will check testicular ultrasound sure no abnormalities of the testicle.  Discussed surgical intervention with patient.  In agreement.  Will discuss with Dr. Madera    Erectile dysfunction--Rx sildenafil 20 mg, max 100 mg Q 24 H.  Serum testosterone, SHBG, FSH, estradiol, and H&H.    BPH without obstruction--urinalysis WBC 0-5, RBC 3-5 per high-power field no bacteria nitrite negative.  PVR 13 mL; Serum PSA today  Problem List Items Addressed This Visit     None      Visit Diagnoses     Swelling of right testicle        Relevant Orders    POCT Bladder Scan (Completed)    POCT Urinalysis w/ Microscope    History of BPH        Relevant Orders    POCT Bladder Scan (Completed)    Prostate Specific Antigen, Diagnostic    Urinary urgency        Relevant Orders    POCT Urinalysis w/ Microscope

## 2020-04-30 NOTE — TELEPHONE ENCOUNTER
----- Message from Jaye Argueta sent at 4/30/2020 10:54 AM CDT -----  Contact: dwight arnold is calling to get a dignose code and verify the how many tabs to take in a 24 hrs. Time frame please call back at [phone]

## 2020-04-30 NOTE — TELEPHONE ENCOUNTER
"Spoke with pt regarding lab results. PSA, A1C WNL. Cholesterol overall good, HDL low: increase exercise.   CMP-elevated alkaline phos, previous labs obtained from [facility] and compared and alk phos level previously elevated as well. He has known history of liver disease. Has had liver ultrasound at [facility], will request medical records.  Hgb 11.4, Hct 35.4, same as previous labs obtained from [facility]. Denies fatigue, hematuria, blood in stool. Has had multiple colonoscopies. Will request records. Pt states "I have had low blood count for a long time and had every scan" Instructed pt we need all these records from [facility], he verbalized understanding and agreed to sign release form.     He saw Urology today who advised against him taking HTCTZ, D/C'd.   "

## 2020-04-30 NOTE — LETTER
April 30, 2020      Carol Marie NP  [address]  [address]  [place] [address]           [place] [facility]  [address] [address]  Phone: [phone]  Fax: [phone]          Patient: Ever Arana   MR Number: [id number]   YOB: 1955   Date of Visit: 4/30/2020       Dear Carol Marie:    Thank you for referring Ever Arana to me for evaluation. Attached you will find relevant portions of my assessment and plan of care.    If you have questions, please do not hesitate to call me. I look forward to following Ever Arana along with you.    Sincerely,    Aakash Peter NP    Enclosure  CC:  No Recipients    If you would like to receive this communication electronically, please contact [email or web address] or [phone] to request more information on [employer] access.    For providers and/or their staff who would like to refer a patient to [facility], please contact us through our one-stop-shop provider referral line, [facility] , at [phone].    If you feel you have received this communication in error or would no longer like to receive these types of communications, please e-mail [email or web address]

## 2020-05-04 ENCOUNTER — OFFICE VISIT (OUTPATIENT)
Dept: UROLOGY | Facility: CLINIC | Age: 65
End: 2020-05-04
Payer: MEDICAID

## 2020-05-04 VITALS — HEART RATE: 87 BPM | SYSTOLIC BLOOD PRESSURE: 147 MMHG | DIASTOLIC BLOOD PRESSURE: 69 MMHG

## 2020-05-04 DIAGNOSIS — N43.3 HYDROCELE, UNSPECIFIED HYDROCELE TYPE: Primary | ICD-10-CM

## 2020-05-04 PROCEDURE — 99213 PR OFFICE/OUTPT VISIT, EST, LEVL III, 20-29 MIN: ICD-10-PCS | Mod: S$GLB,,, | Performed by: UROLOGY

## 2020-05-04 PROCEDURE — 99213 OFFICE O/P EST LOW 20 MIN: CPT | Mod: S$GLB,,, | Performed by: UROLOGY

## 2020-05-04 NOTE — PROGRESS NOTES
Subjective:       Patient ID: Ever Arana is a 65 y.o. male.    Chief Complaint: Other (fu per rc )      HPI: 66 yo fu with hydrocele.  apparantly it omes and goes and seems related to activity.       Past Medical History:   Past Medical History:   Diagnosis Date    ED (erectile dysfunction)     GERD (gastroesophageal reflux disease)     History of hematuria     Hypertension     Reflex sympathetic dystrophy of both lower extremities     Reflex sympathetic dystrophy of both lower extremities        Past Surgical Historical:   Past Surgical History:   Procedure Laterality Date    COLONOSCOPY      ESOPHAGOGASTRODUODENOSCOPY          Medications:   Medication List with Changes/Refills   Current Medications    AMLODIPINE (NORVASC) 5 MG TABLET    Take 5 mg by mouth once daily.    CLOTRIMAZOLE-BETAMETHASONE 1-0.05% (LOTRISONE) CREAM    Apply 45 g topically 2 (two) times daily.    DICLOFENAC SODIUM (VOLTAREN) 1 % GEL    Apply 4 g topically 4 (four) times daily.    HYDROCHLOROTHIAZIDE (MICROZIDE) 12.5 MG CAPSULE    hydrochlorothiazide 12.5 mg capsule    KETOCONAZOLE (NIZORAL) 2 % SHAMPOO    Apply topically twice a week. 3 times weekly    LACTULOSE (CHRONULAC) 10 GRAM/15 ML SOLUTION    Take 10 g by mouth. 30ml per day    MELOXICAM (MOBIC) 7.5 MG TABLET    Take 1 tablet (7.5 mg total) by mouth once daily. Avoid all other NSAIDS while taking    MICONAZOLE (ANTIFUNGAL CREAM, MICONAZOLE,) 2 % CREAM    Apply topically 2 (two) times daily.    MYRBETRIQ 25 MG TB24 ER TABLET    Take 25 mg by mouth Daily. Take 2 tabs (50mg total) by mouth once daily.    PANTOPRAZOLE (PROTONIX) 40 MG TABLET    Take 1 tablet (40 mg total) by mouth once daily.    PREGABALIN (LYRICA) 75 MG CAPSULE    Take 1 capsule (75 mg total) by mouth 2 (two) times daily.        Past Social History:   Social History     Socioeconomic History    Marital status: Single     Spouse name: Not on file    Number of children: Not on file    Years of education: Not  on file    Highest education level: Not on file   Occupational History    Not on file   Social Needs    Financial resource strain: Not on file    Food insecurity:     Worry: Not on file     Inability: Not on file    Transportation needs:     Medical: Not on file     Non-medical: Not on file   Tobacco Use    Smoking status: Former Smoker     Packs/day: 3.00     Years: 10.00     Pack years: 30.00   Substance and Sexual Activity    Alcohol use: Never     Frequency: Never    Drug use: Never    Sexual activity: Not on file   Lifestyle    Physical activity:     Days per week: Not on file     Minutes per session: Not on file    Stress: Not on file   Relationships    Social connections:     Talks on phone: Not on file     Gets together: Not on file     Attends Congregational service: Not on file     Active member of club or organization: Not on file     Attends meetings of clubs or organizations: Not on file     Relationship status: Not on file   Other Topics Concern    Not on file   Social History Narrative    Not on file       Allergies: Review of patient's allergies indicates:  No Known Allergies     Family History:   Family History   Problem Relation Age of Onset    Cancer Mother     Cancer Father     Cancer Brother         Review of Systems:  Review of Systems   Constitutional: Negative for activity change and appetite change.   HENT: Negative for congestion and dental problem.    Respiratory: Negative for chest tightness and shortness of breath.    Cardiovascular: Negative for chest pain.   Gastrointestinal: Negative for abdominal distention and abdominal pain.   Genitourinary: Negative for decreased urine volume, difficulty urinating, discharge, dysuria, enuresis, flank pain, frequency, genital sores, hematuria, penile pain, penile swelling, scrotal swelling, testicular pain and urgency.   Musculoskeletal: Negative for back pain and neck pain.   Neurological: Negative for dizziness.   Hematological:  Negative for adenopathy.   Psychiatric/Behavioral: Negative for agitation, behavioral problems and confusion.       Physical Exam:  Physical Exam   Nursing note and vitals reviewed.  Constitutional: He is oriented to person, place, and time. He appears well-developed and well-nourished.   HENT:   Head: Normocephalic.   Cardiovascular: Normal rate, regular rhythm and normal heart sounds.    Pulmonary/Chest: Effort normal and breath sounds normal.   Abdominal: Soft. Bowel sounds are normal.   Genitourinary:         Neurological: He is alert and oriented to person, place, and time.   Skin: Skin is warm and dry.         Assessment/Plan:     hydrocele--will be conservative and place pt on mobic and cipro--fu two weeks  Problem List Items Addressed This Visit     None

## 2020-05-19 DIAGNOSIS — G90.523 REFLEX SYMPATHETIC DYSTROPHY OF BOTH LOWER EXTREMITIES: ICD-10-CM

## 2020-05-19 DIAGNOSIS — B35.4 TINEA CORPORIS: ICD-10-CM

## 2020-05-19 RX ORDER — DICLOFENAC SODIUM 10 MG/G
4 GEL TOPICAL 4 TIMES DAILY
Qty: 100 G | Refills: 1 | Status: SHIPPED | OUTPATIENT
Start: 2020-05-19 | End: 2020-06-18

## 2020-05-19 RX ORDER — DOXYLAMINE SUCCINATE 25 MG
TABLET ORAL 2 TIMES DAILY
Qty: 28 G | Refills: 0 | Status: SHIPPED | OUTPATIENT
Start: 2020-05-19 | End: 2020-06-05

## 2020-05-20 ENCOUNTER — OFFICE VISIT (OUTPATIENT)
Dept: UROLOGY | Facility: CLINIC | Age: 65
End: 2020-05-20
Payer: MEDICAID

## 2020-05-20 VITALS
SYSTOLIC BLOOD PRESSURE: 144 MMHG | WEIGHT: 185 LBS | HEIGHT: 66 IN | HEART RATE: 86 BPM | OXYGEN SATURATION: 99 % | TEMPERATURE: 98 F | DIASTOLIC BLOOD PRESSURE: 77 MMHG | BODY MASS INDEX: 29.73 KG/M2

## 2020-05-20 DIAGNOSIS — N50.89 SWELLING OF RIGHT TESTICLE: Primary | ICD-10-CM

## 2020-05-20 LAB
BILIRUB UR QL STRIP: POSITIVE
GLUCOSE UR QL STRIP: NEGATIVE
KETONES UR QL STRIP: POSITIVE
LEUKOCYTE ESTERASE UR QL STRIP: NEGATIVE
PH, POC UA: 5.5
POC AMORP, URINE: ABNORMAL
POC BACTI, URINE: ABNORMAL
POC BLOOD, URINE: NEGATIVE
POC CASTS, URINE: ABNORMAL
POC CRYST, URINE: ABNORMAL
POC EPITH, URINE: ABNORMAL
POC HCG, URINE: ABNORMAL
POC HYALIN, URINE: ABNORMAL
POC MUCUS, URINE: ABNORMAL
POC NITRATES, URINE: NEGATIVE
POC OTHER, URINE: ABNORMAL
POC RBC, URINE: ABNORMAL
POC RESIDUAL URINE VOLUME: 14 ML (ref 0–100)
POC WBC, URINE: ABNORMAL
PROT UR QL STRIP: NEGATIVE
SP GR UR STRIP: 1.02 (ref 1–1.03)
UROBILINOGEN UR STRIP-ACNC: 4 (ref 0.3–2.2)

## 2020-05-20 PROCEDURE — 81001 PR  URINALYSIS, AUTO, W/SCOPE: ICD-10-PCS | Mod: S$GLB,,, | Performed by: UROLOGY

## 2020-05-20 PROCEDURE — 99214 OFFICE O/P EST MOD 30 MIN: CPT | Mod: 25,S$GLB,, | Performed by: UROLOGY

## 2020-05-20 PROCEDURE — 81001 URINALYSIS AUTO W/SCOPE: CPT | Mod: S$GLB,,, | Performed by: UROLOGY

## 2020-05-20 PROCEDURE — 99214 PR OFFICE/OUTPT VISIT, EST, LEVL IV, 30-39 MIN: ICD-10-PCS | Mod: 25,S$GLB,, | Performed by: UROLOGY

## 2020-05-20 PROCEDURE — 51798 POCT BLADDER SCAN: ICD-10-PCS | Mod: S$GLB,,, | Performed by: UROLOGY

## 2020-05-20 PROCEDURE — 51798 US URINE CAPACITY MEASURE: CPT | Mod: S$GLB,,, | Performed by: UROLOGY

## 2020-05-20 NOTE — PROGRESS NOTES
Subjective:       Patient ID: Ever Arana Sr. is a 65 y.o. male.    Chief Complaint: Follow-up (2 week fu for Hydrocele)      HPI: 64 yo fu with hydrocele.  apparantly it comes and goes and seems related to activity. US confirms large right hydrocele and left varicocele.  No masses / tumors, symmetric blood flow. Continues  w/ intermittent right testicle pain. US findings discussed.        Past Medical History:   Past Medical History:   Diagnosis Date    ED (erectile dysfunction)     GERD (gastroesophageal reflux disease)     History of hematuria     Hypertension     Reflex sympathetic dystrophy of both lower extremities     Reflex sympathetic dystrophy of both lower extremities     Right hydrocele        Past Surgical Historical:   Past Surgical History:   Procedure Laterality Date    COLONOSCOPY      ESOPHAGOGASTRODUODENOSCOPY          Medications:   Medication List with Changes/Refills   Current Medications    AMLODIPINE (NORVASC) 5 MG TABLET    Take 5 mg by mouth once daily.    CLOTRIMAZOLE-BETAMETHASONE 1-0.05% (LOTRISONE) CREAM    Apply 45 g topically 2 (two) times daily.    DICLOFENAC SODIUM (VOLTAREN) 1 % GEL    Apply 4 g topically 4 (four) times daily.    HYDROCHLOROTHIAZIDE (MICROZIDE) 12.5 MG CAPSULE    hydrochlorothiazide 12.5 mg capsule    KETOCONAZOLE (NIZORAL) 2 % SHAMPOO    Apply topically twice a week. 3 times weekly    LACTULOSE (CHRONULAC) 10 GRAM/15 ML SOLUTION    Take 10 g by mouth. 30ml per day    MELOXICAM (MOBIC) 7.5 MG TABLET    Take 1 tablet (7.5 mg total) by mouth once daily. Avoid all other NSAIDS while taking    MICONAZOLE (ANTIFUNGAL CREAM, MICONAZOLE,) 2 % CREAM    Apply topically 2 (two) times daily.    MYRBETRIQ 25 MG TB24 ER TABLET    Take 25 mg by mouth Daily. Take 2 tabs (50mg total) by mouth once daily.    PANTOPRAZOLE (PROTONIX) 40 MG TABLET    Take 1 tablet (40 mg total) by mouth once daily.    PREGABALIN (LYRICA) 75 MG CAPSULE    Take 1 capsule (75 mg total) by  mouth 2 (two) times daily.        Past Social History:   Social History     Socioeconomic History    Marital status: Single     Spouse name: Not on file    Number of children: Not on file    Years of education: Not on file    Highest education level: Not on file   Occupational History    Not on file   Social Needs    Financial resource strain: Not on file    Food insecurity:     Worry: Not on file     Inability: Not on file    Transportation needs:     Medical: Not on file     Non-medical: Not on file   Tobacco Use    Smoking status: Former Smoker     Packs/day: 3.00     Years: 10.00     Pack years: 30.00   Substance and Sexual Activity    Alcohol use: Never     Frequency: Never    Drug use: Never    Sexual activity: Not on file   Lifestyle    Physical activity:     Days per week: Not on file     Minutes per session: Not on file    Stress: Not on file   Relationships    Social connections:     Talks on phone: Not on file     Gets together: Not on file     Attends Islam service: Not on file     Active member of club or organization: Not on file     Attends meetings of clubs or organizations: Not on file     Relationship status: Not on file   Other Topics Concern    Not on file   Social History Narrative    Not on file       Allergies: Review of patient's allergies indicates:  No Known Allergies     Family History:   Family History   Problem Relation Age of Onset    Cancer Mother     Cancer Father     Cancer Brother         Review of Systems:  Review of Systems   Constitutional: Negative for activity change and appetite change.   HENT: Negative for congestion and dental problem.    Respiratory: Negative for chest tightness and shortness of breath.    Cardiovascular: Negative for chest pain.   Gastrointestinal: Negative for abdominal distention and abdominal pain.   Genitourinary: Negative for decreased urine volume, difficulty urinating, discharge, dysuria, enuresis, flank pain, frequency,  genital sores, hematuria, penile pain, penile swelling, scrotal swelling, testicular pain and urgency.   Musculoskeletal: Negative for back pain and neck pain.   Neurological: Negative for dizziness.   Hematological: Negative for adenopathy.   Psychiatric/Behavioral: Negative for agitation, behavioral problems and confusion.       Physical Exam:  Physical Exam   Constitutional: He is oriented to person, place, and time. He appears well-developed and well-nourished.   HENT:   Head: Normocephalic.   Eyes: No scleral icterus.   Neck: Normal range of motion.   Cardiovascular: Intact distal pulses.    Pulmonary/Chest: Effort normal and breath sounds normal.   Abdominal: Soft. He exhibits no distension. There is no tenderness. No hernia. Hernia confirmed negative in the right inguinal area and confirmed negative in the left inguinal area.   Genitourinary: Penis normal. Cremasteric reflex is present. Right testis shows tenderness.         Genitourinary Comments: Large right hydrocele   Neurological: He is alert and oriented to person, place, and time.   Skin: Skin is warm and dry.     Psychiatric: He has a normal mood and affect.       Assessment/Plan:   Right hydrocele- symptomatic, sgy discussed. Questions answered.    Left varicoele -asymptomatic, monitor for progression    BPH w/o LUTS -UA neg; PVR 14ml, monitor for progression  Problem List Items Addressed This Visit     None      Visit Diagnoses     Swelling of right testicle    -  Primary    Relevant Orders    POCT Bladder Scan    POCT Urinalysis (w/Micro Option)

## 2020-05-20 NOTE — PROGRESS NOTES
Pt seen chart reviewed and pt examined.  Case discussed with NP.  Will schedule right groin exploration hydrocele possible hernia repair

## 2020-05-25 ENCOUNTER — TELEPHONE (OUTPATIENT)
Dept: FAMILY MEDICINE | Facility: CLINIC | Age: 65
End: 2020-05-25

## 2020-05-25 NOTE — TELEPHONE ENCOUNTER
Spoke to the patient and he is going to be scheduled when JAY Medina returns from her vacation.       ----- Message from Maurice Estrada sent at 5/25/2020  8:21 AM CDT -----  Contact: Pt  Please call Ever to discuss a medication question he has [phone].

## 2020-05-28 ENCOUNTER — TELEPHONE (OUTPATIENT)
Dept: UROLOGY | Facility: CLINIC | Age: 65
End: 2020-05-28

## 2020-06-01 ENCOUNTER — CLINICAL SUPPORT (OUTPATIENT)
Dept: UROLOGY | Facility: CLINIC | Age: 65
End: 2020-06-01
Payer: MEDICAID

## 2020-06-01 DIAGNOSIS — N50.89 SWELLING OF RIGHT TESTICLE: Primary | ICD-10-CM

## 2020-06-01 LAB
ANION GAP SERPL CALC-SCNC: 8 MMOL/L (ref 3–11)
APPEARANCE, UA: CLEAR
BACTERIA SPEC CULT: ABNORMAL /HPF
BASOPHILS NFR SNV MANUAL: 0.7 % (ref 0–3)
BILIRUB UR QL STRIP: NEGATIVE MG/DL
BUN SERPL-MCNC: 10 MG/DL (ref 7–18)
BUN/CREAT SERPL: 8.69 RATIO (ref 7–18)
CALCIUM BLD-MCNC: NORMAL MG/DL
CALCIUM SERPL-MCNC: 9.2 MG/DL (ref 8.8–10.5)
CHLORIDE SERPL-SCNC: 109 MMOL/L (ref 100–108)
CO2 SERPL-SCNC: 25 MMOL/L (ref 21–32)
COLOR UR: ABNORMAL
COVID-19 AB, IGM: NORMAL
CREAT SERPL-MCNC: 1.15 MG/DL (ref 0.7–1.3)
EOSINOPHIL NFR SNV MANUAL: 3 % (ref 1–3)
ERYTHROCYTE [DISTWIDTH] IN BLOOD BY AUTOMATED COUNT: 15.9 % (ref 12.5–18)
GFR ESTIMATION: > 60
GLUCOSE (UA): NORMAL MG/DL
GLUCOSE SERPL-MCNC: 103 MG/DL (ref 70–110)
HCT VFR BLD AUTO: 39.2 % (ref 42–52)
HGB BLD-MCNC: 12.7 G/DL (ref 14–18)
HGB UR QL STRIP: NEGATIVE /UL
KETONES UR QL STRIP: ABNORMAL MG/DL
LEUKOCYTE ESTERASE UR QL STRIP: 25 /UL
LYMPHOCYTES NFR SNV MANUAL: 19.3 % (ref 25–40)
MANUAL NRBC PER 100 CELLS: 0 %
MCH RBC QN AUTO: 27.9 PG (ref 27–31.2)
MCHC RBC AUTO-ENTMCNC: 32.4 G/DL (ref 31.8–35.4)
MCV RBC AUTO: 86 FL (ref 80–97)
MONOCYTES/100 LEUKOCYTES: 11.7 % (ref 1–15)
NEUTROPHILS NFR BLD: 4.39 10*3/UL (ref 1.8–7.7)
NEUTROPHILS NFR SNV MANUAL: 65 % (ref 37–80)
NITRITE UR QL STRIP: NEGATIVE
PH UR STRIP: 5 PH (ref 5–9)
PLATELETS: 154 10*3/UL (ref 142–424)
POTASSIUM SERPL-SCNC: 4.3 MMOL/L (ref 3.6–5.2)
PROT UR QL STRIP: NEGATIVE MG/DL
RBC # BLD AUTO: 4.56 10*6/UL (ref 4.7–6.1)
RBC #/AREA URNS HPF: ABNORMAL /HPF (ref 0–2)
SERVICE COMMENT 03: ABNORMAL
SODIUM BLD-SCNC: 142 MMOL/L (ref 135–145)
SP GR UR STRIP: 1.02 (ref 1–1.03)
SPECIMEN COLLECTION METHOD, URINE: ABNORMAL
SQUAMOUS EPITHELIAL, UA: ABNORMAL /LPF
UROBILINOGEN UR STRIP-ACNC: 4 MG/DL
WBC # BLD: 6.8 10*3/UL (ref 4.6–10.2)
WBC #/AREA URNS HPF: ABNORMAL /HPF (ref 0–5)

## 2020-06-05 ENCOUNTER — OFFICE VISIT (OUTPATIENT)
Dept: FAMILY MEDICINE | Facility: CLINIC | Age: 65
End: 2020-06-05
Payer: MEDICAID

## 2020-06-05 VITALS
SYSTOLIC BLOOD PRESSURE: 150 MMHG | TEMPERATURE: 98 F | HEART RATE: 78 BPM | OXYGEN SATURATION: 98 % | DIASTOLIC BLOOD PRESSURE: 70 MMHG | BODY MASS INDEX: 31.9 KG/M2 | HEIGHT: 65 IN | WEIGHT: 191.5 LBS

## 2020-06-05 DIAGNOSIS — I10 ESSENTIAL HYPERTENSION: ICD-10-CM

## 2020-06-05 DIAGNOSIS — Z00.00 ENCOUNTER FOR WELLNESS EXAMINATION IN ADULT: Primary | ICD-10-CM

## 2020-06-05 DIAGNOSIS — G90.523 REFLEX SYMPATHETIC DYSTROPHY OF BOTH LOWER EXTREMITIES: ICD-10-CM

## 2020-06-05 DIAGNOSIS — G90.513 REFLEX SYMPATHETIC DYSTROPHY OF BOTH UPPER EXTREMITIES: ICD-10-CM

## 2020-06-05 DIAGNOSIS — K21.9 GASTROESOPHAGEAL REFLUX DISEASE, ESOPHAGITIS PRESENCE NOT SPECIFIED: ICD-10-CM

## 2020-06-05 DIAGNOSIS — B35.4 TINEA CORPORIS: ICD-10-CM

## 2020-06-05 DIAGNOSIS — K42.9 UMBILICAL HERNIA WITHOUT OBSTRUCTION AND WITHOUT GANGRENE: ICD-10-CM

## 2020-06-05 PROCEDURE — 99396 PR PREVENTIVE VISIT,EST,40-64: ICD-10-PCS | Mod: S$GLB,,, | Performed by: NURSE PRACTITIONER

## 2020-06-05 PROCEDURE — 99396 PREV VISIT EST AGE 40-64: CPT | Mod: S$GLB,,, | Performed by: NURSE PRACTITIONER

## 2020-06-05 RX ORDER — SILDENAFIL CITRATE 20 MG/1
TABLET ORAL
COMMUNITY
Start: 2020-05-08

## 2020-06-05 RX ORDER — MELOXICAM 15 MG/1
15 TABLET ORAL DAILY
Qty: 30 TABLET | Refills: 5 | Status: SHIPPED | OUTPATIENT
Start: 2020-06-05 | End: 2020-12-02

## 2020-06-05 RX ORDER — PREGABALIN 75 MG/1
75 CAPSULE ORAL 2 TIMES DAILY
Qty: 60 CAPSULE | Refills: 0 | Status: SHIPPED | OUTPATIENT
Start: 2020-06-05 | End: 2020-06-09 | Stop reason: SDUPTHER

## 2020-06-05 RX ORDER — PREGABALIN 75 MG/1
75 CAPSULE ORAL 2 TIMES DAILY
Qty: 60 CAPSULE | Refills: 0 | OUTPATIENT
Start: 2020-06-05 | End: 2020-07-05

## 2020-06-05 RX ORDER — AMLODIPINE BESYLATE 10 MG/1
10 TABLET ORAL DAILY
Qty: 30 TABLET | Refills: 11 | Status: SHIPPED | OUTPATIENT
Start: 2020-06-05 | End: 2021-06-05

## 2020-06-05 NOTE — PROGRESS NOTES
"Clinic Note  6/5/2020      Subjective:       Patient ID:  Nile is a 65 y.o. male being seen in office today as an established patient.     Chief Complaint: Follow-up (He hears a "chirping sound at hs and it is as loud as it can be." Told it could be his BP medication. He is having a surgery next week, due to swelling of a testical-fluid and use a drainage. )    Mr. Arana is here today for 3 month follow-up and wellness visit. He has a hx ofHTN, GERD, reflex sympathetic dystrophy, lower extremity swelling, overactive bladder, hydrocele, and persistent fungal rash.     HTN-B/P elevated in office today; compliant with Amlodipine 5mg. Denies checking at home. He reports a rhythmic "chirping" in his ear at night and sometimes when he feels his blood pressure is high. Denies headaches, blurred vision, CP, SOB.     GERD-well controlled on current regimen of Protonix 40mg. Had EGD and colonoscopy last year. Patient does not recall physician who performed procedures but was instructed to return in 10 years    RSD Syndrome-patient states he was diagnosed with this condition by a pain management doctor years ago. The condition affects both bilateral upper and lower extremities, including knee joints. He states it causes constant shooting pain between his extremities and it is debilitating. PT was recommended by pain management that he could not complete due to pain. He was sent to neuro by pain management and was told there was nothing they could do. He had a trial of Gabapentin that he states "didn't help at all". He has been taking Meloxicam with little to no relief and was prescribed Lyrica to try but never filled b/c insurance did not cover.    Hydrocele-scheduled for corrective surgery this week with Dr. Madera. He also has an overactive bladder that he takes Myrbetriq for. He feels like it is not really working and he is up and down all night.     Rash-persistent tinea corporis that has been treated for last 6-8 " months.  He was treated with ketaconozole shampoo and Lotrisone cream by Dr. Delaney with no relief. Also multiple oral antifungals with no relief.     Hernia-has had umbilical hernia for many years following an abdominal surgery. We had discussed conservative management previously but pt recently bumped the hernia causing him pain that his persisted. He would like a surgical consultation at this time.      Family History   Problem Relation Age of Onset    Cancer Mother     Cancer Father     Cancer Brother      Past Surgical History:   Procedure Laterality Date    COLONOSCOPY      ESOPHAGOGASTRODUODENOSCOPY       Social History     Substance and Sexual Activity   Alcohol Use Never    Frequency: Never     Patient has no known allergies.  Medication List with Changes/Refills   New Medications    AMLODIPINE (NORVASC) 10 MG TABLET    Take 1 tablet (10 mg total) by mouth once daily.    MELOXICAM (MOBIC) 15 MG TABLET    Take 1 tablet (15 mg total) by mouth once daily.    PREGABALIN (LYRICA) 75 MG CAPSULE    Take 1 capsule (75 mg total) by mouth 2 (two) times daily.   Current Medications    CLOTRIMAZOLE-BETAMETHASONE 1-0.05% (LOTRISONE) CREAM    Apply 45 g topically 2 (two) times daily.    DICLOFENAC SODIUM (VOLTAREN) 1 % GEL    Apply 4 g topically 4 (four) times daily.    HYDROCHLOROTHIAZIDE (MICROZIDE) 12.5 MG CAPSULE    hydrochlorothiazide 12.5 mg capsule    KETOCONAZOLE (NIZORAL) 2 % SHAMPOO    Apply topically twice a week. 3 times weekly    LACTULOSE (CHRONULAC) 10 GRAM/15 ML SOLUTION    Take 10 g by mouth. 30ml per day    MYRBETRIQ 25 MG TB24 ER TABLET    Take 25 mg by mouth Daily. Take 2 tabs (50mg total) by mouth once daily.    PANTOPRAZOLE (PROTONIX) 40 MG TABLET    Take 1 tablet (40 mg total) by mouth once daily.    SILDENAFIL (REVATIO) 20 MG TAB    TK 1 TO 5 TS PO PRN. NO MORE THAN 5 TS IN 24 H   Discontinued Medications    AMLODIPINE (NORVASC) 5 MG TABLET    Take 5 mg by mouth once daily.    MELOXICAM (MOBIC)  "7.5 MG TABLET    Take 1 tablet (7.5 mg total) by mouth once daily. Avoid all other NSAIDS while taking    MICONAZOLE (ANTIFUNGAL CREAM, MICONAZOLE,) 2 % CREAM    Apply topically 2 (two) times daily.    PREGABALIN (LYRICA) 75 MG CAPSULE    Take 1 capsule (75 mg total) by mouth 2 (two) times daily.       Review of Systems   Constitutional: Negative for appetite change, diaphoresis, fatigue and fever.   HENT: Negative for congestion, ear pain, postnasal drip, rhinorrhea, sinus pressure, sinus pain and sore throat.    Respiratory: Negative for cough, shortness of breath, wheezing and stridor.    Cardiovascular: Negative for chest pain, palpitations and leg swelling.   Gastrointestinal: Positive for abdominal pain. Negative for blood in stool, constipation, diarrhea, nausea and vomiting.   Genitourinary: Positive for frequency. Negative for flank pain, hematuria and urgency.   Musculoskeletal: Positive for arthralgias, gait problem and myalgias. Negative for joint swelling.   Skin: Positive for rash. Negative for color change.   Neurological: Negative for dizziness, tremors, seizures, speech difficulty, weakness, numbness and headaches.   Psychiatric/Behavioral: Negative for confusion, decreased concentration, hallucinations and sleep disturbance. The patient is not nervous/anxious.               BP (!) 150/70 (BP Location: Left arm, Patient Position: Sitting, BP Method: Large (Manual))   Pulse 78   Temp 98 °F (36.7 °C) (Oral)   Ht 5' 5" (1.651 m)   Wt 86.9 kg (191 lb 8 oz)   SpO2 98%   BMI 31.87 kg/m²   Estimated body mass index is 31.87 kg/m² as calculated from the following:    Height as of this encounter: 5' 5" (1.651 m).    Weight as of this encounter: 86.9 kg (191 lb 8 oz).    Objective:        Physical Exam   Constitutional: He is oriented to person, place, and time. He appears well-developed and well-nourished.   HENT:   Head: Normocephalic and atraumatic.   Right Ear: Tympanic membrane normal.   Left Ear: " Tympanic membrane normal.   Nose: Nose normal.   Mouth/Throat: Uvula is midline, oropharynx is clear and moist and mucous membranes are normal. No oropharyngeal exudate, posterior oropharyngeal edema or posterior oropharyngeal erythema.   Eyes: Pupils are equal, round, and reactive to light. Conjunctivae and EOM are normal.   Neck: Trachea normal, normal range of motion and full passive range of motion without pain. Neck supple. No JVD present. Carotid bruit is not present. No thyroid mass and no thyromegaly present.   Cardiovascular: Normal rate, regular rhythm and intact distal pulses. Exam reveals no gallop and no friction rub.   No murmur heard.  Pulmonary/Chest: Effort normal and breath sounds normal. No stridor. No respiratory distress. He has no wheezes. He has no rhonchi. He has no rales.   Abdominal: Soft. Bowel sounds are normal. He exhibits no distension, no abdominal bruit and no mass. There is no tenderness. There is no CVA tenderness.   Umbilical hernia noted   Musculoskeletal: Normal range of motion.   Lymphadenopathy:     He has no cervical adenopathy.   Neurological: He is alert and oriented to person, place, and time. He has normal strength. No cranial nerve deficit or sensory deficit. Gait abnormal.   Skin: Skin is warm, dry and intact. Capillary refill takes less than 2 seconds. Rash noted.   Annular erythematous scaly plaques noted to bilateral upper extremities   Psychiatric: He has a normal mood and affect. His speech is normal and behavior is normal. Judgment and thought content normal. His mood appears not anxious. Cognition and memory are normal. He does not exhibit a depressed mood. He expresses no suicidal ideation. He expresses no suicidal plans and no homicidal plans.   Nursing note and vitals reviewed.         Assessment and Plan:         Ever was seen today for follow-up.    Diagnoses and all orders for this visit:    Encounter for wellness examination in adult    Essential  "hypertension  -     amLODIPine (NORVASC) 10 MG tablet; Take 1 tablet (10 mg total) by mouth once daily.    Umbilical hernia without obstruction and without gangrene  -     Ambulatory referral/consult to General Surgery; Future    Reflex sympathetic dystrophy of both lower extremities  -     meloxicam (MOBIC) 15 MG tablet; Take 1 tablet (15 mg total) by mouth once daily.  -     pregabalin (LYRICA) 75 MG capsule; Take 1 capsule (75 mg total) by mouth 2 (two) times daily.    Reflex sympathetic dystrophy of both upper extremities  -     meloxicam (MOBIC) 15 MG tablet; Take 1 tablet (15 mg total) by mouth once daily.  -     pregabalin (LYRICA) 75 MG capsule; Take 1 capsule (75 mg total) by mouth 2 (two) times daily.    Gastroesophageal reflux disease, esophagitis presence not specified  Comments:  Continue Protonix    Tinea corporis  -     Ambulatory referral/consult to Dermatology; Future    Discussed with pt "chirping" he is hearing is likely his heartbeat related to elevated B/P. Trial of increasing Amlodipine. Keep B/P log and bring to follow-up. If chirping continues despite increase may need alternate therapy.   Due to persistent rash despite multiple therapies Derm consult.  Trial of Lyrica for chronic pain: Good RX coupon given to pt and instructed to fill at [facility] for best price.     Follow up:   Follow up in about 3 months (around 9/5/2020), or sooner if needed.            Carol Marie NP       "

## 2020-06-09 ENCOUNTER — TELEPHONE (OUTPATIENT)
Dept: FAMILY MEDICINE | Facility: CLINIC | Age: 65
End: 2020-06-09

## 2020-06-09 DIAGNOSIS — G90.523 REFLEX SYMPATHETIC DYSTROPHY OF BOTH LOWER EXTREMITIES: ICD-10-CM

## 2020-06-09 DIAGNOSIS — G90.513 REFLEX SYMPATHETIC DYSTROPHY OF BOTH UPPER EXTREMITIES: ICD-10-CM

## 2020-06-09 RX ORDER — PREGABALIN 75 MG/1
75 CAPSULE ORAL 2 TIMES DAILY
Qty: 60 CAPSULE | Refills: 0 | Status: SHIPPED | OUTPATIENT
Start: 2020-06-09 | End: 2020-07-09

## 2020-06-09 NOTE — TELEPHONE ENCOUNTER
Would you see the message at the bottom of this note. Thank you.     ----- Message from Gracy Blanco sent at 6/9/2020  8:22 AM CDT -----  Contact: PT  Type:  RX Refill Request    Who Called: Ever Arana Sr.  Refill or New Rx: NEW RX for nerve pain  RX Name and Strength:N/A  How is the patient currently taking it? (ex. 1XDay):N/A  Is this a 30 day or 90 day RX:N/A  Preferred Pharmacy with phone number:  [id number] - [place], [place] - [address]  AT [place] [address]  [address]  [place] [address]  Phone: [phone] Fax: [phone]  Local or Mail Order:local   Ordering Provider:Carol Marie NP  Would the patient rather a call back or a response via [facility]? Call back  Best Call Back Number:[phone] (cell)  Additional Information: Patient would like his rx called into new pharmacy [facility] instead of [facility]. Patient doesn't remember what the name of the new prescription is. Please leave a detailed message if patient is unavailable.

## 2020-06-11 ENCOUNTER — TELEPHONE (OUTPATIENT)
Dept: FAMILY MEDICINE | Facility: CLINIC | Age: 65
End: 2020-06-11

## 2020-06-11 ENCOUNTER — OUTSIDE PLACE OF SERVICE (OUTPATIENT)
Dept: UROLOGY | Facility: CLINIC | Age: 65
End: 2020-06-11
Payer: MEDICAID

## 2020-06-11 PROCEDURE — 55040 PR REMOVAL OF HYDROCELE,TUNICA,UNILAT: ICD-10-PCS | Mod: 51,RT,, | Performed by: UROLOGY

## 2020-06-11 PROCEDURE — 49505 PR REPAIR ING HERNIA,5+Y/O,REDUCIBL: ICD-10-PCS | Mod: RT,,, | Performed by: UROLOGY

## 2020-06-11 PROCEDURE — 55040 REMOVAL OF HYDROCELE: CPT | Mod: 51,RT,, | Performed by: UROLOGY

## 2020-06-11 PROCEDURE — 49505 PRP I/HERN INIT REDUC >5 YR: CPT | Mod: RT,,, | Performed by: UROLOGY

## 2020-06-15 ENCOUNTER — TELEPHONE (OUTPATIENT)
Dept: UROLOGY | Facility: CLINIC | Age: 65
End: 2020-06-15

## 2020-06-15 ENCOUNTER — OFFICE VISIT (OUTPATIENT)
Dept: UROLOGY | Facility: CLINIC | Age: 65
End: 2020-06-15
Payer: MEDICAID

## 2020-06-15 VITALS — DIASTOLIC BLOOD PRESSURE: 92 MMHG | RESPIRATION RATE: 18 BRPM | SYSTOLIC BLOOD PRESSURE: 149 MMHG

## 2020-06-15 DIAGNOSIS — N43.3 HYDROCELE, UNSPECIFIED HYDROCELE TYPE: Primary | ICD-10-CM

## 2020-06-15 PROCEDURE — 99024 POSTOP FOLLOW-UP VISIT: CPT | Mod: S$GLB,,, | Performed by: UROLOGY

## 2020-06-15 PROCEDURE — 99024 PR POST-OP FOLLOW-UP VISIT: ICD-10-PCS | Mod: S$GLB,,, | Performed by: UROLOGY

## 2020-06-15 RX ORDER — AMOXICILLIN AND CLAVULANATE POTASSIUM 875; 125 MG/1; MG/1
TABLET, FILM COATED ORAL
COMMUNITY
Start: 2020-06-11 | End: 2021-01-15

## 2020-06-15 NOTE — TELEPHONE ENCOUNTER
----- Message from Ida Wilson sent at 6/15/2020  8:08 AM CDT -----  .Type:  Needs Medical Advice    Who Called: Patient   Symptoms (please be specific):  procedure pain   How long has patient had these symptoms:    Pharmacy name and phone #:    Would the patient rather a call back or a response via [facility]?  Call   Best Call Back Number:  [phone]  Additional Information: n/a

## 2020-06-15 NOTE — PROGRESS NOTES
Subjective:       Patient ID: Ever Arana Sr. is a 65 y.o. male.    Chief Complaint: Follow-up      HPI: 66 yo fu hydrocele hernia repair.  Has a lot of swelling       Past Medical History:   Past Medical History:   Diagnosis Date    ED (erectile dysfunction)     GERD (gastroesophageal reflux disease)     History of hematuria     Hypertension     Reflex sympathetic dystrophy of both lower extremities     Reflex sympathetic dystrophy of both lower extremities     Right hydrocele        Past Surgical Historical:   Past Surgical History:   Procedure Laterality Date    COLONOSCOPY      ESOPHAGOGASTRODUODENOSCOPY      REPAIR OF INGUINAL HERNIA WITH HYDROCELECTOMY Right         Medications:   Medication List with Changes/Refills   Current Medications    AMLODIPINE (NORVASC) 10 MG TABLET    Take 1 tablet (10 mg total) by mouth once daily.    AMOXICILLIN-CLAVULANATE 875-125MG (AUGMENTIN) 875-125 MG PER TABLET        CLOTRIMAZOLE-BETAMETHASONE 1-0.05% (LOTRISONE) CREAM    Apply 45 g topically 2 (two) times daily.    DICLOFENAC SODIUM (VOLTAREN) 1 % GEL    Apply 4 g topically 4 (four) times daily.    HYDROCHLOROTHIAZIDE (MICROZIDE) 12.5 MG CAPSULE    hydrochlorothiazide 12.5 mg capsule    KETOCONAZOLE (NIZORAL) 2 % SHAMPOO    Apply topically twice a week. 3 times weekly    LACTULOSE (CHRONULAC) 10 GRAM/15 ML SOLUTION    Take 10 g by mouth. 30ml per day    MELOXICAM (MOBIC) 15 MG TABLET    Take 1 tablet (15 mg total) by mouth once daily.    MYRBETRIQ 25 MG TB24 ER TABLET    Take 25 mg by mouth Daily. Take 2 tabs (50mg total) by mouth once daily.    PANTOPRAZOLE (PROTONIX) 40 MG TABLET    Take 1 tablet (40 mg total) by mouth once daily.    PREGABALIN (LYRICA) 75 MG CAPSULE    Take 1 capsule (75 mg total) by mouth 2 (two) times daily.    SILDENAFIL (REVATIO) 20 MG TAB    TK 1 TO 5 TS PO PRN. NO MORE THAN 5 TS IN 24 H        Past Social History:   Social History     Socioeconomic History    Marital status: Single      Spouse name: Not on file    Number of children: Not on file    Years of education: Not on file    Highest education level: Not on file   Occupational History    Not on file   Social Needs    Financial resource strain: Not on file    Food insecurity     Worry: Not on file     Inability: Not on file    Transportation needs     Medical: Not on file     Non-medical: Not on file   Tobacco Use    Smoking status: Former Smoker     Packs/day: 3.00     Years: 10.00     Pack years: 30.00   Substance and Sexual Activity    Alcohol use: Never     Frequency: Never    Drug use: Never    Sexual activity: Not on file   Lifestyle    Physical activity     Days per week: Not on file     Minutes per session: Not on file    Stress: Not on file   Relationships    Social connections     Talks on phone: Not on file     Gets together: Not on file     Attends Uatsdin service: Not on file     Active member of club or organization: Not on file     Attends meetings of clubs or organizations: Not on file     Relationship status: Not on file   Other Topics Concern    Not on file   Social History Narrative    Not on file       Allergies: Review of patient's allergies indicates:  No Known Allergies     Family History:   Family History   Problem Relation Age of Onset    Cancer Mother     Cancer Father     Cancer Brother         Review of Systems:  Review of Systems   Constitutional: Negative for activity change and appetite change.   HENT: Negative for congestion and dental problem.    Respiratory: Negative for chest tightness and shortness of breath.    Cardiovascular: Negative for chest pain.   Gastrointestinal: Negative for abdominal distention and abdominal pain.   Genitourinary: Negative for decreased urine volume, difficulty urinating, discharge, dysuria, enuresis, flank pain, frequency, genital sores, hematuria, penile pain, penile swelling, scrotal swelling, testicular pain and urgency.   Musculoskeletal: Negative  for back pain and neck pain.   Neurological: Negative for dizziness.   Hematological: Negative for adenopathy.   Psychiatric/Behavioral: Negative for agitation, behavioral problems and confusion.       Physical Exam:  Physical Exam   Nursing note and vitals reviewed.  Constitutional: He is oriented to person, place, and time. He appears well-developed.   HENT:   Head: Normocephalic.   Cardiovascular: Normal rate, regular rhythm and normal heart sounds.    Pulmonary/Chest: Effort normal and breath sounds normal.   Abdominal: Soft. Bowel sounds are normal.   Genitourinary:         Neurological: He is alert and oriented to person, place, and time.   Skin: Skin is warm and dry.         Assessment/Plan:     sp hydrocele hernia repair will leave drain in--needs to elevate scrotum--fu two days  Problem List Items Addressed This Visit     None

## 2020-06-17 ENCOUNTER — OFFICE VISIT (OUTPATIENT)
Dept: UROLOGY | Facility: CLINIC | Age: 65
End: 2020-06-17
Payer: MEDICAID

## 2020-06-17 VITALS
SYSTOLIC BLOOD PRESSURE: 152 MMHG | HEART RATE: 97 BPM | OXYGEN SATURATION: 97 % | DIASTOLIC BLOOD PRESSURE: 86 MMHG | RESPIRATION RATE: 20 BRPM

## 2020-06-17 DIAGNOSIS — N43.3 HYDROCELE, UNSPECIFIED HYDROCELE TYPE: Primary | ICD-10-CM

## 2020-06-17 PROCEDURE — 99024 POSTOP FOLLOW-UP VISIT: CPT | Mod: S$GLB,,, | Performed by: UROLOGY

## 2020-06-17 PROCEDURE — 99024 PR POST-OP FOLLOW-UP VISIT: ICD-10-PCS | Mod: S$GLB,,, | Performed by: UROLOGY

## 2020-06-17 NOTE — PROGRESS NOTES
Subjective:       Patient ID: Ever Arana Sr. is a 65 y.o. male.    Chief Complaint: Follow-up (rt hydrocele and hernia follow up )      HPI: Post op hydrocele hernia repair.  Pt states swelling has gone down a lot.  Pt also states still having fair amount of drainage from the [place]       Past Medical History:   Past Medical History:   Diagnosis Date    ED (erectile dysfunction)     GERD (gastroesophageal reflux disease)     History of hematuria     Hypertension     Reflex sympathetic dystrophy of both lower extremities     Reflex sympathetic dystrophy of both lower extremities     Right hydrocele        Past Surgical Historical:   Past Surgical History:   Procedure Laterality Date    COLONOSCOPY      ESOPHAGOGASTRODUODENOSCOPY      REPAIR OF INGUINAL HERNIA WITH HYDROCELECTOMY Right         Medications:   Medication List with Changes/Refills   Current Medications    AMLODIPINE (NORVASC) 10 MG TABLET    Take 1 tablet (10 mg total) by mouth once daily.    AMOXICILLIN-CLAVULANATE 875-125MG (AUGMENTIN) 875-125 MG PER TABLET        CLOTRIMAZOLE-BETAMETHASONE 1-0.05% (LOTRISONE) CREAM    Apply 45 g topically 2 (two) times daily.    DICLOFENAC SODIUM (VOLTAREN) 1 % GEL    Apply 4 g topically 4 (four) times daily.    HYDROCHLOROTHIAZIDE (MICROZIDE) 12.5 MG CAPSULE    hydrochlorothiazide 12.5 mg capsule    KETOCONAZOLE (NIZORAL) 2 % SHAMPOO    Apply topically twice a week. 3 times weekly    LACTULOSE (CHRONULAC) 10 GRAM/15 ML SOLUTION    Take 10 g by mouth. 30ml per day    MELOXICAM (MOBIC) 15 MG TABLET    Take 1 tablet (15 mg total) by mouth once daily.    MYRBETRIQ 25 MG TB24 ER TABLET    Take 25 mg by mouth Daily. Take 2 tabs (50mg total) by mouth once daily.    PANTOPRAZOLE (PROTONIX) 40 MG TABLET    Take 1 tablet (40 mg total) by mouth once daily.    PREGABALIN (LYRICA) 75 MG CAPSULE    Take 1 capsule (75 mg total) by mouth 2 (two) times daily.    SILDENAFIL (REVATIO) 20 MG TAB    TK 1 TO 5 TS PO PRN.  NO MORE THAN 5 TS IN 24 H        Past Social History:   Social History     Socioeconomic History    Marital status: Single     Spouse name: Not on file    Number of children: Not on file    Years of education: Not on file    Highest education level: Not on file   Occupational History    Not on file   Social Needs    Financial resource strain: Not on file    Food insecurity     Worry: Not on file     Inability: Not on file    Transportation needs     Medical: Not on file     Non-medical: Not on file   Tobacco Use    Smoking status: Former Smoker     Packs/day: 3.00     Years: 10.00     Pack years: 30.00   Substance and Sexual Activity    Alcohol use: Never     Frequency: Never    Drug use: Never    Sexual activity: Not on file   Lifestyle    Physical activity     Days per week: Not on file     Minutes per session: Not on file    Stress: Not on file   Relationships    Social connections     Talks on phone: Not on file     Gets together: Not on file     Attends Mu-ism service: Not on file     Active member of club or organization: Not on file     Attends meetings of clubs or organizations: Not on file     Relationship status: Not on file   Other Topics Concern    Not on file   Social History Narrative    Not on file       Allergies: Review of patient's allergies indicates:  No Known Allergies     Family History:   Family History   Problem Relation Age of Onset    Cancer Mother     Cancer Father     Cancer Brother         Review of Systems:  Review of Systems   Constitutional: Negative for activity change and appetite change.   HENT: Negative for congestion and dental problem.    Respiratory: Negative for chest tightness and shortness of breath.    Cardiovascular: Negative for chest pain.   Gastrointestinal: Negative for abdominal distention and abdominal pain.   Genitourinary: Negative for decreased urine volume, difficulty urinating, discharge, dysuria, enuresis, flank pain, frequency, genital  sores, hematuria, penile pain, penile swelling, scrotal swelling, testicular pain and urgency.   Musculoskeletal: Negative for back pain and neck pain.   Neurological: Negative for dizziness.   Hematological: Negative for adenopathy.   Psychiatric/Behavioral: Negative for agitation, behavioral problems and confusion.       Physical Exam:  Physical Exam   Nursing note and vitals reviewed.  Constitutional: He is oriented to person, place, and time. He appears well-developed.   HENT:   Head: Normocephalic.   Cardiovascular: Normal rate, regular rhythm and normal heart sounds.    Pulmonary/Chest: Effort normal and breath sounds normal.   Abdominal: Soft. Bowel sounds are normal.   Genitourinary:         Neurological: He is alert and oriented to person, place, and time.   Skin: Skin is warm and dry.         Assessment/Plan:     sp hydrocel hernia repair.  Will leave drain in--fu Friday will remove drain then  Problem List Items Addressed This Visit     None

## 2020-06-19 ENCOUNTER — OFFICE VISIT (OUTPATIENT)
Dept: UROLOGY | Facility: CLINIC | Age: 65
End: 2020-06-19
Payer: MEDICAID

## 2020-06-19 VITALS — SYSTOLIC BLOOD PRESSURE: 144 MMHG | RESPIRATION RATE: 17 BRPM | DIASTOLIC BLOOD PRESSURE: 89 MMHG

## 2020-06-19 DIAGNOSIS — N43.3 HYDROCELE, UNSPECIFIED HYDROCELE TYPE: Primary | ICD-10-CM

## 2020-06-19 PROCEDURE — 99024 POSTOP FOLLOW-UP VISIT: CPT | Mod: S$GLB,,, | Performed by: UROLOGY

## 2020-06-19 PROCEDURE — 99024 PR POST-OP FOLLOW-UP VISIT: ICD-10-PCS | Mod: S$GLB,,, | Performed by: UROLOGY

## 2020-06-19 NOTE — PROGRESS NOTES
Subjective:       Patient ID: Ever Arana Sr. is a 65 y.o. male.    Chief Complaint: Follow-up      HPI: Post op hydrocele hernia. Pt states swelling has gone down a lot.       Past Medical History:   Past Medical History:   Diagnosis Date    ED (erectile dysfunction)     GERD (gastroesophageal reflux disease)     History of hematuria     Hypertension     Reflex sympathetic dystrophy of both lower extremities     Reflex sympathetic dystrophy of both lower extremities     Right hydrocele        Past Surgical Historical:   Past Surgical History:   Procedure Laterality Date    COLONOSCOPY      ESOPHAGOGASTRODUODENOSCOPY      REPAIR OF INGUINAL HERNIA WITH HYDROCELECTOMY Right         Medications:   Medication List with Changes/Refills   Current Medications    AMLODIPINE (NORVASC) 10 MG TABLET    Take 1 tablet (10 mg total) by mouth once daily.    AMOXICILLIN-CLAVULANATE 875-125MG (AUGMENTIN) 875-125 MG PER TABLET        CLOTRIMAZOLE-BETAMETHASONE 1-0.05% (LOTRISONE) CREAM    Apply 45 g topically 2 (two) times daily.    DICLOFENAC SODIUM (VOLTAREN) 1 % GEL    Apply 4 g topically 4 (four) times daily.    HYDROCHLOROTHIAZIDE (MICROZIDE) 12.5 MG CAPSULE    hydrochlorothiazide 12.5 mg capsule    KETOCONAZOLE (NIZORAL) 2 % SHAMPOO    Apply topically twice a week. 3 times weekly    LACTULOSE (CHRONULAC) 10 GRAM/15 ML SOLUTION    Take 10 g by mouth. 30ml per day    MELOXICAM (MOBIC) 15 MG TABLET    Take 1 tablet (15 mg total) by mouth once daily.    MYRBETRIQ 25 MG TB24 ER TABLET    Take 25 mg by mouth Daily. Take 2 tabs (50mg total) by mouth once daily.    PANTOPRAZOLE (PROTONIX) 40 MG TABLET    Take 1 tablet (40 mg total) by mouth once daily.    PREGABALIN (LYRICA) 75 MG CAPSULE    Take 1 capsule (75 mg total) by mouth 2 (two) times daily.    SILDENAFIL (REVATIO) 20 MG TAB    TK 1 TO 5 TS PO PRN. NO MORE THAN 5 TS IN 24 H        Past Social History:   Social History     Socioeconomic History    Marital status:  Single     Spouse name: Not on file    Number of children: Not on file    Years of education: Not on file    Highest education level: Not on file   Occupational History    Not on file   Social Needs    Financial resource strain: Not on file    Food insecurity     Worry: Not on file     Inability: Not on file    Transportation needs     Medical: Not on file     Non-medical: Not on file   Tobacco Use    Smoking status: Former Smoker     Packs/day: 3.00     Years: 10.00     Pack years: 30.00   Substance and Sexual Activity    Alcohol use: Never     Frequency: Never    Drug use: Never    Sexual activity: Not on file   Lifestyle    Physical activity     Days per week: Not on file     Minutes per session: Not on file    Stress: Not on file   Relationships    Social connections     Talks on phone: Not on file     Gets together: Not on file     Attends Judaism service: Not on file     Active member of club or organization: Not on file     Attends meetings of clubs or organizations: Not on file     Relationship status: Not on file   Other Topics Concern    Not on file   Social History Narrative    Not on file       Allergies: Review of patient's allergies indicates:  No Known Allergies     Family History:   Family History   Problem Relation Age of Onset    Cancer Mother     Cancer Father     Cancer Brother         Review of Systems:  Review of Systems    Physical Exam:  Physical Exam   Genitourinary:             Assessment/Plan:     sp hydrocele hernia repair--will dc drain--fu in one week  Problem List Items Addressed This Visit     None

## 2020-06-22 ENCOUNTER — OFFICE VISIT (OUTPATIENT)
Dept: UROLOGY | Facility: CLINIC | Age: 65
End: 2020-06-22
Payer: MEDICAID

## 2020-06-22 VITALS
HEIGHT: 65 IN | DIASTOLIC BLOOD PRESSURE: 84 MMHG | BODY MASS INDEX: 31.49 KG/M2 | RESPIRATION RATE: 18 BRPM | SYSTOLIC BLOOD PRESSURE: 130 MMHG | WEIGHT: 189 LBS

## 2020-06-22 DIAGNOSIS — N43.3 HYDROCELE, UNSPECIFIED HYDROCELE TYPE: Primary | ICD-10-CM

## 2020-06-22 PROCEDURE — 99024 PR POST-OP FOLLOW-UP VISIT: ICD-10-PCS | Mod: S$GLB,,, | Performed by: UROLOGY

## 2020-06-22 PROCEDURE — 99024 POSTOP FOLLOW-UP VISIT: CPT | Mod: S$GLB,,, | Performed by: UROLOGY

## 2020-06-22 NOTE — PROGRESS NOTES
Subjective:       Patient ID: Ever Arana Sr. is a 65 y.o. male.    Chief Complaint: Groin Swelling      HPI: 65-year-old male known service Dr. aCssy mckenzie status post hydrocelectomy June 11, 2020.  He was just seen Friday last week at which time Dr. Cassy mckenzie removed his drain.  He returns today complaint of no drainage from the drain site.  He was instructed to soak in the tub twice daily.  He states he has been compliant with this.  Everything seem well Saturday until Saturday evening when he noticed no further drainage.  He has some increased scrotal discomfort at that time.  Presented to clinic today for re-evaluation.  Patient has been afebrile       Past Medical History:   Past Medical History:   Diagnosis Date    ED (erectile dysfunction)     GERD (gastroesophageal reflux disease)     History of hematuria     Hypertension     Reflex sympathetic dystrophy of both lower extremities     Reflex sympathetic dystrophy of both lower extremities     Right hydrocele        Past Surgical Historical:   Past Surgical History:   Procedure Laterality Date    COLONOSCOPY      ESOPHAGOGASTRODUODENOSCOPY      REPAIR OF INGUINAL HERNIA WITH HYDROCELECTOMY Right 06/11/2020        Medications:   Medication List with Changes/Refills   Current Medications    AMLODIPINE (NORVASC) 10 MG TABLET    Take 1 tablet (10 mg total) by mouth once daily.    AMOXICILLIN-CLAVULANATE 875-125MG (AUGMENTIN) 875-125 MG PER TABLET        CLOTRIMAZOLE-BETAMETHASONE 1-0.05% (LOTRISONE) CREAM    Apply 45 g topically 2 (two) times daily.    DICLOFENAC SODIUM (VOLTAREN) 1 % GEL    Apply 4 g topically 4 (four) times daily.    HYDROCHLOROTHIAZIDE (MICROZIDE) 12.5 MG CAPSULE    hydrochlorothiazide 12.5 mg capsule    KETOCONAZOLE (NIZORAL) 2 % SHAMPOO    Apply topically twice a week. 3 times weekly    LACTULOSE (CHRONULAC) 10 GRAM/15 ML SOLUTION    Take 10 g by mouth. 30ml per day    MELOXICAM (MOBIC) 15 MG TABLET    Take 1 tablet (15 mg  total) by mouth once daily.    MYRBETRIQ 25 MG TB24 ER TABLET    Take 25 mg by mouth Daily. Take 2 tabs (50mg total) by mouth once daily.    PANTOPRAZOLE (PROTONIX) 40 MG TABLET    Take 1 tablet (40 mg total) by mouth once daily.    PREGABALIN (LYRICA) 75 MG CAPSULE    Take 1 capsule (75 mg total) by mouth 2 (two) times daily.    SILDENAFIL (REVATIO) 20 MG TAB    TK 1 TO 5 TS PO PRN. NO MORE THAN 5 TS IN 24 H        Past Social History:   Social History     Socioeconomic History    Marital status: Single     Spouse name: Not on file    Number of children: Not on file    Years of education: Not on file    Highest education level: Not on file   Occupational History    Not on file   Social Needs    Financial resource strain: Not on file    Food insecurity     Worry: Not on file     Inability: Not on file    Transportation needs     Medical: Not on file     Non-medical: Not on file   Tobacco Use    Smoking status: Former Smoker     Packs/day: 3.00     Years: 10.00     Pack years: 30.00   Substance and Sexual Activity    Alcohol use: Never     Frequency: Never    Drug use: Never    Sexual activity: Not on file   Lifestyle    Physical activity     Days per week: Not on file     Minutes per session: Not on file    Stress: Not on file   Relationships    Social connections     Talks on phone: Not on file     Gets together: Not on file     Attends Voodoo service: Not on file     Active member of club or organization: Not on file     Attends meetings of clubs or organizations: Not on file     Relationship status: Not on file   Other Topics Concern    Not on file   Social History Narrative    Not on file       Allergies: Review of patient's allergies indicates:  No Known Allergies     Family History:   Family History   Problem Relation Age of Onset    Cancer Mother     Cancer Father     Cancer Brother         Review of Systems:  Review of Systems   Constitutional: Negative for activity change and appetite  change.   HENT: Negative for congestion and dental problem.    Respiratory: Negative for chest tightness and shortness of breath.    Cardiovascular: Negative for chest pain.   Gastrointestinal: Negative for abdominal distention and abdominal pain.   Genitourinary: Positive for scrotal swelling (Right). Negative for decreased urine volume, difficulty urinating, discharge, dysuria, enuresis, flank pain, frequency, genital sores, hematuria, penile pain, penile swelling, testicular pain and urgency.   Musculoskeletal: Negative for back pain and neck pain.   Neurological: Negative for dizziness.   Hematological: Negative for adenopathy.   Psychiatric/Behavioral: Negative for agitation, behavioral problems and confusion.       Physical Exam:  Physical Exam   Constitutional: He is oriented to person, place, and time. He appears well-developed.   HENT:   Head: Normocephalic.   Eyes: No scleral icterus.   Neck: Normal range of motion.   Pulmonary/Chest: Effort normal and breath sounds normal.   Abdominal: Soft. He exhibits no distension. There is no abdominal tenderness. No hernia. Hernia confirmed negative in the right inguinal area and confirmed negative in the left inguinal area.   Genitourinary:    Testes and penis normal.   Cremasteric reflex is present.          Genitourinary Comments: Serous sanguinous fluid  from the previous drain site.  No obvious blood.  No erythema of the overlying skin tissue.  No obvious signs of infection.     Neurological: He is alert and oriented to person, place, and time.   Skin: Skin is warm and dry.         Assessment/Plan:   Status post right hydrocelectomy--I asked Dr. Madera to come in and evaluate the patient as he saw him on last Friday.  Rhonda Madera concurs no obvious sign of infection.  No need for further antibiotic indicated at this time.  Continue twice daily soaks in the tub.  Scrotal support.  Keep appointment Friday this week sooner as needed  Problem List Items  Addressed This Visit     None

## 2020-06-26 ENCOUNTER — OFFICE VISIT (OUTPATIENT)
Dept: UROLOGY | Facility: CLINIC | Age: 65
End: 2020-06-26
Payer: MEDICAID

## 2020-06-26 VITALS
HEART RATE: 72 BPM | RESPIRATION RATE: 20 BRPM | SYSTOLIC BLOOD PRESSURE: 147 MMHG | OXYGEN SATURATION: 98 % | DIASTOLIC BLOOD PRESSURE: 72 MMHG

## 2020-06-26 DIAGNOSIS — N43.3 HYDROCELE IN ADULT: Primary | ICD-10-CM

## 2020-06-26 PROCEDURE — 99024 PR POST-OP FOLLOW-UP VISIT: ICD-10-PCS | Mod: S$GLB,,, | Performed by: UROLOGY

## 2020-06-26 PROCEDURE — 99024 POSTOP FOLLOW-UP VISIT: CPT | Mod: S$GLB,,, | Performed by: UROLOGY

## 2020-06-26 NOTE — PROGRESS NOTES
Subjective:       Patient ID: Ever Arana Sr. is a 65 y.o. male.    Chief Complaint: Follow-up (1 week f/u hydrocele)      HPI: Post op hernia hydrocele repair.  Pt states feeling better       Past Medical History:   Past Medical History:   Diagnosis Date    ED (erectile dysfunction)     GERD (gastroesophageal reflux disease)     History of hematuria     Hypertension     Reflex sympathetic dystrophy of both lower extremities     Reflex sympathetic dystrophy of both lower extremities     Right hydrocele        Past Surgical Historical:   Past Surgical History:   Procedure Laterality Date    COLONOSCOPY      ESOPHAGOGASTRODUODENOSCOPY      REPAIR OF INGUINAL HERNIA WITH HYDROCELECTOMY Right 06/11/2020        Medications:   Medication List with Changes/Refills   Current Medications    AMLODIPINE (NORVASC) 10 MG TABLET    Take 1 tablet (10 mg total) by mouth once daily.    AMOXICILLIN-CLAVULANATE 875-125MG (AUGMENTIN) 875-125 MG PER TABLET        CLOTRIMAZOLE-BETAMETHASONE 1-0.05% (LOTRISONE) CREAM    Apply 45 g topically 2 (two) times daily.    DICLOFENAC SODIUM (VOLTAREN) 1 % GEL    Apply 4 g topically 4 (four) times daily.    HYDROCHLOROTHIAZIDE (MICROZIDE) 12.5 MG CAPSULE    hydrochlorothiazide 12.5 mg capsule    KETOCONAZOLE (NIZORAL) 2 % SHAMPOO    Apply topically twice a week. 3 times weekly    LACTULOSE (CHRONULAC) 10 GRAM/15 ML SOLUTION    Take 10 g by mouth. 30ml per day    MELOXICAM (MOBIC) 15 MG TABLET    Take 1 tablet (15 mg total) by mouth once daily.    MYRBETRIQ 25 MG TB24 ER TABLET    Take 25 mg by mouth Daily. Take 2 tabs (50mg total) by mouth once daily.    PANTOPRAZOLE (PROTONIX) 40 MG TABLET    Take 1 tablet (40 mg total) by mouth once daily.    PREGABALIN (LYRICA) 75 MG CAPSULE    Take 1 capsule (75 mg total) by mouth 2 (two) times daily.    SILDENAFIL (REVATIO) 20 MG TAB    TK 1 TO 5 TS PO PRN. NO MORE THAN 5 TS IN 24 H        Past Social History:   Social History     Socioeconomic  History    Marital status: Single     Spouse name: Not on file    Number of children: Not on file    Years of education: Not on file    Highest education level: Not on file   Occupational History    Not on file   Social Needs    Financial resource strain: Not on file    Food insecurity     Worry: Not on file     Inability: Not on file    Transportation needs     Medical: Not on file     Non-medical: Not on file   Tobacco Use    Smoking status: Former Smoker     Packs/day: 3.00     Years: 10.00     Pack years: 30.00   Substance and Sexual Activity    Alcohol use: Never     Frequency: Never    Drug use: Never    Sexual activity: Not on file   Lifestyle    Physical activity     Days per week: Not on file     Minutes per session: Not on file    Stress: Not on file   Relationships    Social connections     Talks on phone: Not on file     Gets together: Not on file     Attends Anglican service: Not on file     Active member of club or organization: Not on file     Attends meetings of clubs or organizations: Not on file     Relationship status: Not on file   Other Topics Concern    Not on file   Social History Narrative    Not on file       Allergies: Review of patient's allergies indicates:  No Known Allergies     Family History:   Family History   Problem Relation Age of Onset    Cancer Mother     Cancer Father     Cancer Brother         Review of Systems:  Review of Systems   Constitutional: Negative for activity change and appetite change.   HENT: Negative for congestion and dental problem.    Respiratory: Negative for chest tightness and shortness of breath.    Cardiovascular: Negative for chest pain.   Gastrointestinal: Negative for abdominal distention and abdominal pain.   Genitourinary: Negative for decreased urine volume, difficulty urinating, discharge, dysuria, enuresis, flank pain, frequency, genital sores, hematuria, penile pain, penile swelling, scrotal swelling, testicular pain and  urgency.   Musculoskeletal: Negative for back pain and neck pain.   Neurological: Negative for dizziness.   Hematological: Negative for adenopathy.   Psychiatric/Behavioral: Negative for agitation, behavioral problems and confusion.       Physical Exam:  Physical Exam   Nursing note and vitals reviewed.  Constitutional: He is oriented to person, place, and time. He appears well-developed.   HENT:   Head: Normocephalic.   Cardiovascular: Normal rate, regular rhythm and normal heart sounds.    Pulmonary/Chest: Effort normal and breath sounds normal.   Abdominal: Soft. Bowel sounds are normal.   Genitourinary:         Neurological: He is alert and oriented to person, place, and time.   Skin: Skin is warm and dry.         Assessment/Plan:     sp hydrocele hernia repair doing ok--fu two weeks  Problem List Items Addressed This Visit     None

## 2020-07-01 ENCOUNTER — TELEPHONE (OUTPATIENT)
Dept: UROLOGY | Facility: CLINIC | Age: 65
End: 2020-07-01

## 2020-07-01 ENCOUNTER — OFFICE VISIT (OUTPATIENT)
Dept: UROLOGY | Facility: CLINIC | Age: 65
End: 2020-07-01
Payer: MEDICAID

## 2020-07-01 DIAGNOSIS — N43.3 HYDROCELE IN ADULT: Primary | ICD-10-CM

## 2020-07-01 DIAGNOSIS — R53.83 FATIGUE, UNSPECIFIED TYPE: ICD-10-CM

## 2020-07-01 LAB
ALBUMIN SERPL-MCNC: 3.6 G/DL (ref 3.5–5.2)
ALBUMIN SERPL-MCNC: 3.6 G/DL (ref 3.5–5.2)
ALBUMIN/GLOB SERPL ELPH: 1.3 {RATIO} (ref 1–2.7)
ALP ISOS SERPL LEV INH-CCNC: 217 U/L (ref 40–130)
ALP ISOS SERPL LEV INH-CCNC: 217 U/L (ref 40–130)
ALT (SGPT): 20 U/L (ref 0–41)
ALT (SGPT): 20 U/L (ref 0–41)
ANION GAP SERPL CALC-SCNC: 10 MMOL/L (ref 8–17)
AST SERPL-CCNC: 35 U/L (ref 0–40)
AST SERPL-CCNC: 35 U/L (ref 0–40)
BILIRUB CONJ+UNCONJ SERPL-MCNC: 0.6 MG/DL (ref 0.1–0.8)
BILIRUBIN DIRECT+TOT PNL SERPL-MCNC: 0.33 MG/DL (ref 0–0.3)
BILIRUBIN, TOTAL: 0.88 MG/DL (ref 0–1.2)
BILIRUBIN, TOTAL: 0.88 MG/DL (ref 0–1.2)
BUN/CREAT SERPL: 8.9 (ref 6–20)
CALCIUM SERPL-MCNC: 8.8 MG/DL (ref 8.6–10.2)
CARBON DIOXIDE, CO2: 22 MMOL/L (ref 22–29)
CHLORIDE: 110 MMOL/L (ref 98–107)
CREAT SERPL-MCNC: 0.92 MG/DL (ref 0.7–1.2)
GFR ESTIMATION: 82.83
GLOBULIN: 2.7 G/DL (ref 1.5–4.5)
GLOBULIN: 2.7 G/DL (ref 1.5–4.5)
GLUCOSE: 108 MG/DL (ref 82–115)
POTASSIUM: 4.1 MMOL/L (ref 3.5–5.1)
PROT SNV-MCNC: 6.3 G/DL (ref 6.4–8.3)
PROT SNV-MCNC: 6.3 G/DL (ref 6.4–8.3)
SODIUM: 142 MMOL/L (ref 136–145)
UREA NITROGEN (BUN): 8.2 MG/DL (ref 8–23)

## 2020-07-01 PROCEDURE — 99024 POSTOP FOLLOW-UP VISIT: CPT | Mod: S$GLB,,, | Performed by: UROLOGY

## 2020-07-01 PROCEDURE — 99024 PR POST-OP FOLLOW-UP VISIT: ICD-10-PCS | Mod: S$GLB,,, | Performed by: UROLOGY

## 2020-07-01 NOTE — PROGRESS NOTES
Subjective:       Patient ID: Ever Arana Sr. is a 65 y.o. male.    Chief Complaint: Scrotal Swelling      HPI: 66 yo fu sp hydrocele complaining of scrotal swelling and pain.  Pt states urinating well       Past Medical History:   Past Medical History:   Diagnosis Date    ED (erectile dysfunction)     GERD (gastroesophageal reflux disease)     History of hematuria     Hypertension     Reflex sympathetic dystrophy of both lower extremities     Reflex sympathetic dystrophy of both lower extremities     Right hydrocele        Past Surgical Historical:   Past Surgical History:   Procedure Laterality Date    COLONOSCOPY      ESOPHAGOGASTRODUODENOSCOPY      REPAIR OF INGUINAL HERNIA WITH HYDROCELECTOMY Right 06/11/2020        Medications:   Medication List with Changes/Refills   Current Medications    AMLODIPINE (NORVASC) 10 MG TABLET    Take 1 tablet (10 mg total) by mouth once daily.    AMOXICILLIN-CLAVULANATE 875-125MG (AUGMENTIN) 875-125 MG PER TABLET        CLOTRIMAZOLE-BETAMETHASONE 1-0.05% (LOTRISONE) CREAM    Apply 45 g topically 2 (two) times daily.    DICLOFENAC SODIUM (VOLTAREN) 1 % GEL    Apply 4 g topically 4 (four) times daily.    HYDROCHLOROTHIAZIDE (MICROZIDE) 12.5 MG CAPSULE    hydrochlorothiazide 12.5 mg capsule    KETOCONAZOLE (NIZORAL) 2 % SHAMPOO    Apply topically twice a week. 3 times weekly    LACTULOSE (CHRONULAC) 10 GRAM/15 ML SOLUTION    Take 10 g by mouth. 30ml per day    MELOXICAM (MOBIC) 15 MG TABLET    Take 1 tablet (15 mg total) by mouth once daily.    MYRBETRIQ 25 MG TB24 ER TABLET    Take 25 mg by mouth Daily. Take 2 tabs (50mg total) by mouth once daily.    PANTOPRAZOLE (PROTONIX) 40 MG TABLET    Take 1 tablet (40 mg total) by mouth once daily.    PREGABALIN (LYRICA) 75 MG CAPSULE    Take 1 capsule (75 mg total) by mouth 2 (two) times daily.    SILDENAFIL (REVATIO) 20 MG TAB    TK 1 TO 5 TS PO PRN. NO MORE THAN 5 TS IN 24 H        Past Social History:   Social History      Socioeconomic History    Marital status: Single     Spouse name: Not on file    Number of children: Not on file    Years of education: Not on file    Highest education level: Not on file   Occupational History    Not on file   Social Needs    Financial resource strain: Not on file    Food insecurity     Worry: Not on file     Inability: Not on file    Transportation needs     Medical: Not on file     Non-medical: Not on file   Tobacco Use    Smoking status: Former Smoker     Packs/day: 3.00     Years: 10.00     Pack years: 30.00   Substance and Sexual Activity    Alcohol use: Never     Frequency: Never    Drug use: Never    Sexual activity: Not on file   Lifestyle    Physical activity     Days per week: Not on file     Minutes per session: Not on file    Stress: Not on file   Relationships    Social connections     Talks on phone: Not on file     Gets together: Not on file     Attends Jehovah's witness service: Not on file     Active member of club or organization: Not on file     Attends meetings of clubs or organizations: Not on file     Relationship status: Not on file   Other Topics Concern    Not on file   Social History Narrative    Not on file       Allergies: Review of patient's allergies indicates:  No Known Allergies     Family History:   Family History   Problem Relation Age of Onset    Cancer Mother     Cancer Father     Cancer Brother         Review of Systems:  Review of Systems    Physical Exam:  Physical Exam   Genitourinary:         under local anesthesia drained off 600 plus cc    Assessment/Plan:     post op hydrocele--concern about fluid will culture check lfts fu in early next week  Problem List Items Addressed This Visit     None

## 2020-07-01 NOTE — TELEPHONE ENCOUNTER
----- Message from Magdaleno Roque sent at 7/1/2020  2:03 PM CDT -----  Contact: EL PAZ SR. [[id number]]  Type:  Needs Medical Advice    Who Called: Pt   Symptoms (please be specific): pt has been having issues with pain   How long has patient had these symptoms:  3 weeks  Pharmacy name and phone #:    [id number] - [place], [place] - [address]  AT [place] [address]  [address]  [place] [address]  Phone: [phone] Fax: [phone]  Would the patient rather a call back or a response via [facility]? Call back   Best Call Back Number: [phone] or [phone] home   Additional Information: Caller is calling regards to his surgery // pt states that he has been in pain since 06/07/2020 nothing has changed // pt says its worst then before and he hasnt been able to sleep // he states the pain is really sharp //

## 2020-07-02 ENCOUNTER — TELEPHONE (OUTPATIENT)
Dept: FAMILY MEDICINE | Facility: CLINIC | Age: 65
End: 2020-07-02

## 2020-07-02 ENCOUNTER — TELEPHONE (OUTPATIENT)
Dept: UROLOGY | Facility: CLINIC | Age: 65
End: 2020-07-02

## 2020-07-02 RX ORDER — FUROSEMIDE 20 MG/1
20 TABLET ORAL 2 TIMES DAILY
Qty: 2 TABLET | Refills: 0 | Status: SHIPPED | OUTPATIENT
Start: 2020-07-02 | End: 2020-07-03

## 2020-07-02 NOTE — TELEPHONE ENCOUNTER
----- Message from Indu Heredia sent at 7/2/2020  9:23 AM CDT -----  Contact: pt  Type:  Needs Medical Advice    Who Called: Ever  Symptoms (please be specific): leaking fluid from drainage    How long has patient had these symptoms:  since last night  Pharmacy name and phone #:    [id number] - [place] - [address]  AT [place] [address]  [address]  [place] [address]  Phone: [phone] Fax: [phone]    [facility] - [place], [place] - [address]  [address]  [place] [address]  Phone: [phone] Fax: [phone]      Would the patient rather a call back or a response via [facility]? call  Best Call Back Number: [phone]  Additional Information:

## 2020-07-02 NOTE — TELEPHONE ENCOUNTER
----- Message from Kiley Mason sent at 7/2/2020  9:33 AM CDT -----  Pt calling concerning pregabalin (LYRICA) 75 MG capsule he was reading the side affects and he is experiencing swelling. He will like to find out what he need to do. [phone] or [phone]      Thanks   Kiley Mason

## 2020-07-06 ENCOUNTER — OFFICE VISIT (OUTPATIENT)
Dept: UROLOGY | Facility: CLINIC | Age: 65
End: 2020-07-06
Payer: MEDICAID

## 2020-07-06 VITALS
DIASTOLIC BLOOD PRESSURE: 69 MMHG | OXYGEN SATURATION: 98 % | RESPIRATION RATE: 18 BRPM | SYSTOLIC BLOOD PRESSURE: 144 MMHG | HEART RATE: 113 BPM

## 2020-07-06 DIAGNOSIS — R14.0 ABDOMINAL DISTENSION (GASEOUS): ICD-10-CM

## 2020-07-06 PROCEDURE — 99214 PR OFFICE/OUTPT VISIT, EST, LEVL IV, 30-39 MIN: ICD-10-PCS | Mod: 24,S$GLB,, | Performed by: UROLOGY

## 2020-07-06 PROCEDURE — 99214 OFFICE O/P EST MOD 30 MIN: CPT | Mod: 24,S$GLB,, | Performed by: UROLOGY

## 2020-07-06 NOTE — PROGRESS NOTES
Subjective:       Patient ID: Ever Arana Sr. is a 65 y.o. male.    Chief Complaint: Follow-up (testicle pain)      HPI: 66 yo male sp hydrocele repair who has had continues scrotal swelling.  Last week I drained 700 cc from his scrotum.  I am very concerned he has ascites.  His abdomen is quite swollen.  His lfts showed an elevated alk phos.  Pt apparently has a history of esophageal varicosities       Past Medical History:   Past Medical History:   Diagnosis Date    ED (erectile dysfunction)     GERD (gastroesophageal reflux disease)     History of hematuria     Hypertension     Reflex sympathetic dystrophy of both lower extremities     Reflex sympathetic dystrophy of both lower extremities     Right hydrocele        Past Surgical Historical:   Past Surgical History:   Procedure Laterality Date    COLONOSCOPY      ESOPHAGOGASTRODUODENOSCOPY      REPAIR OF INGUINAL HERNIA WITH HYDROCELECTOMY Right 06/11/2020        Medications:   Medication List with Changes/Refills   Current Medications    AMLODIPINE (NORVASC) 10 MG TABLET    Take 1 tablet (10 mg total) by mouth once daily.    AMOXICILLIN-CLAVULANATE 875-125MG (AUGMENTIN) 875-125 MG PER TABLET        CLOTRIMAZOLE-BETAMETHASONE 1-0.05% (LOTRISONE) CREAM    Apply 45 g topically 2 (two) times daily.    DICLOFENAC SODIUM (VOLTAREN) 1 % GEL    Apply 4 g topically 4 (four) times daily.    FUROSEMIDE (LASIX) 20 MG TABLET    Take 1 tablet (20 mg total) by mouth 2 (two) times daily. for 2 doses    HYDROCHLOROTHIAZIDE (MICROZIDE) 12.5 MG CAPSULE    hydrochlorothiazide 12.5 mg capsule    KETOCONAZOLE (NIZORAL) 2 % SHAMPOO    Apply topically twice a week. 3 times weekly    LACTULOSE (CHRONULAC) 10 GRAM/15 ML SOLUTION    Take 10 g by mouth. 30ml per day    MELOXICAM (MOBIC) 15 MG TABLET    Take 1 tablet (15 mg total) by mouth once daily.    MYRBETRIQ 25 MG TB24 ER TABLET    Take 25 mg by mouth Daily. Take 2 tabs (50mg total) by mouth once daily.    PANTOPRAZOLE  (PROTONIX) 40 MG TABLET    Take 1 tablet (40 mg total) by mouth once daily.    PREGABALIN (LYRICA) 75 MG CAPSULE    Take 1 capsule (75 mg total) by mouth 2 (two) times daily.    SILDENAFIL (REVATIO) 20 MG TAB    TK 1 TO 5 TS PO PRN. NO MORE THAN 5 TS IN 24 H        Past Social History:   Social History     Socioeconomic History    Marital status: Single     Spouse name: Not on file    Number of children: Not on file    Years of education: Not on file    Highest education level: Not on file   Occupational History    Not on file   Social Needs    Financial resource strain: Not on file    Food insecurity     Worry: Not on file     Inability: Not on file    Transportation needs     Medical: Not on file     Non-medical: Not on file   Tobacco Use    Smoking status: Former Smoker     Packs/day: 3.00     Years: 10.00     Pack years: 30.00   Substance and Sexual Activity    Alcohol use: Never     Frequency: Never    Drug use: Never    Sexual activity: Not on file   Lifestyle    Physical activity     Days per week: Not on file     Minutes per session: Not on file    Stress: Not on file   Relationships    Social connections     Talks on phone: Not on file     Gets together: Not on file     Attends Amish service: Not on file     Active member of club or organization: Not on file     Attends meetings of clubs or organizations: Not on file     Relationship status: Not on file   Other Topics Concern    Not on file   Social History Narrative    Not on file       Allergies: Review of patient's allergies indicates:  No Known Allergies     Family History:   Family History   Problem Relation Age of Onset    Cancer Mother     Cancer Father     Cancer Brother         Review of Systems:  Review of Systems   Constitutional: Negative for activity change and appetite change.   HENT: Negative for congestion and dental problem.    Eyes: Negative for visual disturbance.   Respiratory: Negative for chest tightness and  shortness of breath.    Cardiovascular: Negative for chest pain.   Gastrointestinal: Negative for abdominal distention and abdominal pain.   Genitourinary: Negative for decreased urine volume, difficulty urinating, discharge, dysuria, enuresis, flank pain, frequency, genital sores, hematuria, penile pain, penile swelling, scrotal swelling, testicular pain and urgency.   Musculoskeletal: Negative for back pain and neck pain.   Skin: Negative for color change.   Neurological: Negative for dizziness.   Hematological: Negative for adenopathy.   Psychiatric/Behavioral: Negative for agitation, behavioral problems and confusion.       Physical Exam:  Physical Exam   Nursing note and vitals reviewed.  Constitutional: He is oriented to person, place, and time. He appears well-developed.   HENT:   Head: Normocephalic.   Eyes: Pupils are equal, round, and reactive to light.   Neck: Normal range of motion. Neck supple.   Cardiovascular: Normal rate, regular rhythm and normal heart sounds.    Pulmonary/Chest: Effort normal and breath sounds normal.   Abdominal: Soft. Bowel sounds are normal. He exhibits distension.   Genitourinary:    Prostate, penis and rectum normal.           Musculoskeletal: Normal range of motion.   Neurological: He is alert and oriented to person, place, and time.   Skin: Skin is warm and dry.     Psychiatric: His behavior is normal.       Assessment/Plan:     scrotal swelling most likely from ascites will check stat ct scan today  Problem List Items Addressed This Visit     None

## 2020-07-07 ENCOUNTER — TELEPHONE (OUTPATIENT)
Dept: UROLOGY | Facility: CLINIC | Age: 65
End: 2020-07-07

## 2020-07-07 NOTE — TELEPHONE ENCOUNTER
----- Message from Stanley Madera MD sent at 7/7/2020  7:43 AM CDT -----  Pt has what appears to be cirrhosis with ascites.  He needs to go to [facility] ER to be evaluated .  Please let ER there know he is coming and send them a copy of this ct scan

## 2020-07-08 ENCOUNTER — TELEPHONE (OUTPATIENT)
Dept: FAMILY MEDICINE | Facility: CLINIC | Age: 65
End: 2020-07-08

## 2020-07-08 NOTE — TELEPHONE ENCOUNTER
Pt advised JAY Medina left for the day and will make the referral for tomorrow. He was advised that if anything gets worse or changes, he needs to f/u with the ER immediately from here until there is a return call from the office below. I suggested [facility] or [facility]. Pt verbalized understanding.       ----- Message from Kiley Mason sent at 7/8/2020  3:53 PM CDT -----  Vida calling from [facility] giving information on a GI Doctor in the area of the patient. [place] [address] [facility] [address] number for GI doctor [phone] and general number is [phone].     Another Doctor opt.   Dr. Preet Stewart  [address] [place] [address] Rd   [id number]    [phone]        Thanks   Kiley Mason

## 2020-07-08 NOTE — TELEPHONE ENCOUNTER
Vida with [facility] will try and find an MD in [place] and will call back with the info.     ----- Message from Roslyn Carney sent at 7/8/2020  2:19 PM CDT -----  Please call Vida with [facility] Connect or patient regarding patient being referred to a GI doctor.. Call back number [phone] or [phone] s

## 2020-07-08 NOTE — TELEPHONE ENCOUNTER
"Pt called to advise that he went to [facility] ER on yesterday and was sent home at 1 am. He states he has "trouble breathing, gluid on his stomch region and testicles." Pt states they advised him they would send his records to us so that you could make a referral to a surgeon to have the fluid removed bc it was very serious.   He did have COVID testing and waiting on results. I advised him as soon as I received his records I would let you know to review. If he had not heard from me by 3 pm he could call me back and I advised he may need to sign a release. Did you need to make a virtual visit?   "

## 2020-07-09 ENCOUNTER — TELEPHONE (OUTPATIENT)
Dept: FAMILY MEDICINE | Facility: CLINIC | Age: 65
End: 2020-07-09

## 2020-07-09 DIAGNOSIS — K74.60 CIRRHOSIS OF LIVER WITH ASCITES, UNSPECIFIED HEPATIC CIRRHOSIS TYPE: Primary | ICD-10-CM

## 2020-07-09 DIAGNOSIS — R18.8 CIRRHOSIS OF LIVER WITH ASCITES, UNSPECIFIED HEPATIC CIRRHOSIS TYPE: Primary | ICD-10-CM

## 2020-07-09 DIAGNOSIS — R74.8 ELEVATED ALKALINE PHOSPHATASE LEVEL: ICD-10-CM

## 2020-07-09 NOTE — TELEPHONE ENCOUNTER
This has been faxed on this am as the patient has been notified this am and this afternoon. I left a detailed voicemail for the  to advise her as well.     ----- Message from Maurice Estrada sent at 7/9/2020  3:43 PM CDT -----  Regarding: Vida  with [facility]  Vida is calling to get a referral faxed to the following:    [place] [id number] [facility] [phone]    Fax: [phone]

## 2020-07-09 NOTE — TELEPHONE ENCOUNTER
Duplicate message. Previously documented in separate message.     ----- Message from Jennifer Sorensen sent at 7/9/2020 12:34 PM CDT -----  Regarding: referral  Contact: Patient  Please call patient back concerning his referral for gasto at Ph .[phone]   (Vida - [phone])

## 2020-07-09 NOTE — TELEPHONE ENCOUNTER
Pt states that Dr Stewart is in [place]. So we will have to wait till [facility] gives him a call. I advised him if he had not heard from them tomorrow by one, to call me and let me know.     ----- Message from Jennifer Sorensen sent at 7/9/2020 12:18 PM CDT -----  Contact: Patient  Patient declined the referral would prefer someone in the [facility].

## 2020-07-09 NOTE — TELEPHONE ENCOUNTER
FYI: Both referrals were completed. Pt has been advised two referrals were sent to [facility] per his insurance. I advised whomever calls first he needs to accept. He also advised again to go the ER if needed before then. Pt verbalized understanding.

## 2020-07-10 ENCOUNTER — TELEPHONE (OUTPATIENT)
Dept: FAMILY MEDICINE | Facility: CLINIC | Age: 65
End: 2020-07-10

## 2020-07-10 NOTE — TELEPHONE ENCOUNTER
----- Message from Magdaleno Roque sent at 7/10/2020  9:30 AM CDT -----  Contact: EL PAZ SR. [[id number]]  Caller is calling in regards to the medication he received a few days ago and its not working for him // pt states that he is in pain and needs something else called in // caller can be reached at [phone]

## 2020-07-13 ENCOUNTER — TELEPHONE (OUTPATIENT)
Dept: UROLOGY | Facility: CLINIC | Age: 65
End: 2020-07-13

## 2020-07-13 NOTE — TELEPHONE ENCOUNTER
----- Message from Magdaleno Roque sent at 7/13/2020  9:43 AM CDT -----  Contact: EL PAZ SR. [[id number]]  Type:  Needs Medical Advice    Who Called: Pt  Symptoms (please be specific):   How long has patient had these symptoms:   Pharmacy name and phone #:    Would the patient rather a call back or a response via [facility]? Call back  Best Call Back Number: [phone]  Additional Information: Caller is calling in regards to his insurance paperwork and the office doctor notes // pt states he got his in the mail Saturday //and pt states he needs someone to call him ASAP //pt also tested Positive for Covid-19 and he states he got it from the hospital

## 2020-07-14 ENCOUNTER — TELEPHONE (OUTPATIENT)
Dept: FAMILY MEDICINE | Facility: CLINIC | Age: 65
End: 2020-07-14

## 2020-07-14 NOTE — TELEPHONE ENCOUNTER
Called the patient to advise the referral has been received at the [facility] clinic and they are going to call him. I told him I did not know how soon. He states he has been to [facility] ER on Friday and was sent home. He also states he went to [facility] twice and was sent home. Pt verbalized understanding.       ----- Message from Jada Estrada sent at 7/14/2020  2:24 PM CDT -----  duplicate message regarding status of stomach fluid removal...[phone] or [phone]   (urgent per patient)

## 2020-07-16 ENCOUNTER — TELEPHONE (OUTPATIENT)
Dept: UROLOGY | Facility: CLINIC | Age: 65
End: 2020-07-16

## 2020-07-16 NOTE — TELEPHONE ENCOUNTER
----- Message from Kaila Colin sent at 7/16/2020  9:57 AM CDT -----  Contact: Pt  Is calling rg pt having fluid removed from abdomen at [facility] and was informed to come on in to see Dr to complete having the fluid removed/ pt will have the paperwork from his visit and can be reached at [phone] or his cell [phone]//thanks/dbw     Pt is wanting to be worked in on today or tomorrow if possible due to discomfort and pain and to get the fluid off so that he can walk. Pt can barely walk , it's the size of a grapefruit

## 2020-07-17 ENCOUNTER — TELEPHONE (OUTPATIENT)
Dept: FAMILY MEDICINE | Facility: CLINIC | Age: 65
End: 2020-07-17

## 2020-07-17 NOTE — TELEPHONE ENCOUNTER
Spoke to this patient several times about this referral. He spoke to Dr SHARMA, whom advised him he can no longer do anything until he see's GI.   Pt was advised this week or the end of last week, by me, that [facility] has received the referral but I can do nothing else. It is upon their discretion to schedule him.      Pt had drained from his abd 5 liters done here at [facility].   Pt was advised to go to the ER for the weekend if he needs to.   ----- Message from Jada Estrada sent at 7/17/2020 11:13 AM CDT -----  duplicate message regarding having fluid drained in [place]..[phone] (urgent per patient)

## 2020-07-21 ENCOUNTER — TELEPHONE (OUTPATIENT)
Dept: FAMILY MEDICINE | Facility: CLINIC | Age: 65
End: 2020-07-21

## 2020-07-21 NOTE — TELEPHONE ENCOUNTER
Pt advised that I cannot do anything further and that I reminded him they did verify his referral was received. I  advised he was just waiting on a call back from the [facility] for scheduling. [phone]  He states his son fell of his motorcycle and was air [place]  I told him he could drive to [facility] and go through the ER if he could not wait any longer. Some how we got cut off and I LVM to give him all this info.    I did contact him back and spoke to him directly for the information.       ----- Message from Roslyn Carney sent at 7/21/2020 11:48 AM CDT -----  patient need to speak to nurse regarding referral/paperwork. Call back number [phone]. [place]

## 2020-07-29 ENCOUNTER — TELEPHONE (OUTPATIENT)
Dept: FAMILY MEDICINE | Facility: CLINIC | Age: 65
End: 2020-07-29

## 2020-07-29 NOTE — TELEPHONE ENCOUNTER
Pt states he received a call from [facility] in [place] on Monday.They advised him it took a while to get him scheduled bc of COVID.  He did not take the appointment from [place] due to the ER MD, stating they would give him a specialists from [facility].  He was wanting you to sign a continuous order for draining the fluid. The ER at [facility] did advise him he can no longer come into the ER for draining of the fluid.     I explained to the patient that since they gave him the name and number of the specialits here, that is whom he needs to call to get scheduled with. He is advised that draining the fluid does not solve the answer to what is causing this. I advised that we are primary care and the reason you sent him to a specialists is due to the proper care they can give him. He verbalized understanding.     ----- Message from Jada Estrada sent at 7/29/2020  9:43 AM CDT -----  states that paperwork needs to be signed so that he can have fluid drained every 14 days at [facility], phone is [phone]. patient call back is [phone] or [phone] (urgent per patient, has appt set up)

## 2020-12-08 ENCOUNTER — OFFICE VISIT (OUTPATIENT)
Dept: UROLOGY | Facility: CLINIC | Age: 65
End: 2020-12-08
Payer: MEDICAID

## 2020-12-08 VITALS
RESPIRATION RATE: 18 BRPM | HEART RATE: 88 BPM | HEIGHT: 65 IN | DIASTOLIC BLOOD PRESSURE: 63 MMHG | SYSTOLIC BLOOD PRESSURE: 123 MMHG | BODY MASS INDEX: 31.49 KG/M2 | WEIGHT: 189 LBS

## 2020-12-08 DIAGNOSIS — R39.15 URINARY URGENCY: ICD-10-CM

## 2020-12-08 DIAGNOSIS — N20.0 RENAL STONE: ICD-10-CM

## 2020-12-08 DIAGNOSIS — N43.3 HYDROCELE IN ADULT: Primary | ICD-10-CM

## 2020-12-08 LAB — POC RESIDUAL URINE VOLUME: 29 ML (ref 0–100)

## 2020-12-08 PROCEDURE — 99214 OFFICE O/P EST MOD 30 MIN: CPT | Mod: S$GLB,,, | Performed by: UROLOGY

## 2020-12-08 PROCEDURE — 99214 PR OFFICE/OUTPT VISIT, EST, LEVL IV, 30-39 MIN: ICD-10-PCS | Mod: S$GLB,,, | Performed by: UROLOGY

## 2020-12-08 PROCEDURE — 51798 US URINE CAPACITY MEASURE: CPT | Mod: S$GLB,,, | Performed by: UROLOGY

## 2020-12-08 PROCEDURE — 51798 POCT BLADDER SCAN: ICD-10-PCS | Mod: S$GLB,,, | Performed by: UROLOGY

## 2020-12-08 NOTE — PROGRESS NOTES
Subjective:       Patient ID: Ever Arana Sr. is a 65 y.o. male.    Chief Complaint: ER f/u, Hydrocele (was admitted to [facility] for abt 2 wks), and Nephrolithiasis      HPI: 64 yo male complains of right sided scrotal swelling.  Patient apparantly had a hydrocele hernia repair on that side in June.  Patient states swelling comes and goes       Past Medical History:   Past Medical History:   Diagnosis Date    ED (erectile dysfunction)     GERD (gastroesophageal reflux disease)     History of hematuria     Hypertension     Reflex sympathetic dystrophy of both lower extremities     Reflex sympathetic dystrophy of both lower extremities     Renal stone     Right hydrocele        Past Surgical Historical:   Past Surgical History:   Procedure Laterality Date    COLONOSCOPY      ESOPHAGOGASTRODUODENOSCOPY      REPAIR OF INGUINAL HERNIA WITH HYDROCELECTOMY Right 06/11/2020        Medications:   Medication List with Changes/Refills   Current Medications    AMLODIPINE (NORVASC) 10 MG TABLET    Take 1 tablet (10 mg total) by mouth once daily.    AMOXICILLIN-CLAVULANATE 875-125MG (AUGMENTIN) 875-125 MG PER TABLET        CLOTRIMAZOLE-BETAMETHASONE 1-0.05% (LOTRISONE) CREAM    Apply 45 g topically 2 (two) times daily.    DICLOFENAC SODIUM (VOLTAREN) 1 % GEL    Apply 4 g topically 4 (four) times daily.    FUROSEMIDE (LASIX) 20 MG TABLET    Take 1 tablet (20 mg total) by mouth 2 (two) times daily. for 2 doses    HYDROCHLOROTHIAZIDE (MICROZIDE) 12.5 MG CAPSULE    hydrochlorothiazide 12.5 mg capsule    KETOCONAZOLE (NIZORAL) 2 % SHAMPOO    Apply topically twice a week. 3 times weekly    LACTULOSE (CHRONULAC) 10 GRAM/15 ML SOLUTION    Take 10 g by mouth. 30ml per day    MYRBETRIQ 25 MG TB24 ER TABLET    Take 25 mg by mouth Daily. Take 2 tabs (50mg total) by mouth once daily.    PANTOPRAZOLE (PROTONIX) 40 MG TABLET    Take 1 tablet (40 mg total) by mouth once daily.    PREGABALIN (LYRICA) 75 MG CAPSULE    Take 1 capsule (75  mg total) by mouth 2 (two) times daily.    SILDENAFIL (REVATIO) 20 MG TAB    TK 1 TO 5 TS PO PRN. NO MORE THAN 5 TS IN 24 H        Past Social History:   Social History     Socioeconomic History    Marital status: Single     Spouse name: Not on file    Number of children: Not on file    Years of education: Not on file    Highest education level: Not on file   Occupational History    Not on file   Social Needs    Financial resource strain: Not on file    Food insecurity     Worry: Not on file     Inability: Not on file    Transportation needs     Medical: Not on file     Non-medical: Not on file   Tobacco Use    Smoking status: Former Smoker     Packs/day: 3.00     Years: 10.00     Pack years: 30.00   Substance and Sexual Activity    Alcohol use: Never     Frequency: Never    Drug use: Never    Sexual activity: Not on file   Lifestyle    Physical activity     Days per week: Not on file     Minutes per session: Not on file    Stress: Not on file   Relationships    Social connections     Talks on phone: Not on file     Gets together: Not on file     Attends Amish service: Not on file     Active member of club or organization: Not on file     Attends meetings of clubs or organizations: Not on file     Relationship status: Not on file   Other Topics Concern    Not on file   Social History Narrative    Not on file       Allergies: Review of patient's allergies indicates:  No Known Allergies     Family History:   Family History   Problem Relation Age of Onset    Cancer Mother     Cancer Father     Cancer Brother         Review of Systems:  Review of Systems   Constitutional: Negative for activity change and appetite change.   HENT: Negative for congestion and dental problem.    Eyes: Negative for visual disturbance.   Respiratory: Negative for chest tightness and shortness of breath.    Cardiovascular: Negative for chest pain.   Gastrointestinal: Negative for abdominal distention and abdominal pain.    Genitourinary: Negative for decreased urine volume, difficulty urinating, discharge, dysuria, enuresis, flank pain, frequency, genital sores, hematuria, penile pain, penile swelling, scrotal swelling, testicular pain and urgency.   Musculoskeletal: Negative for back pain and neck pain.   Skin: Negative for color change.   Neurological: Negative for dizziness.   Hematological: Negative for adenopathy.   Psychiatric/Behavioral: Negative for agitation, behavioral problems and confusion.       Physical Exam:  Physical Exam  Vitals signs and nursing note reviewed.   Constitutional:       Appearance: He is well-developed.   HENT:      Head: Normocephalic.   Eyes:      Pupils: Pupils are equal, round, and reactive to light.   Neck:      Musculoskeletal: Normal range of motion and neck supple.   Cardiovascular:      Rate and Rhythm: Normal rate and regular rhythm.      Heart sounds: Normal heart sounds.   Pulmonary:      Effort: Pulmonary effort is normal.      Breath sounds: Normal breath sounds.   Abdominal:      General: Bowel sounds are normal.      Palpations: Abdomen is soft.   Genitourinary:      Musculoskeletal: Normal range of motion.   Skin:     General: Skin is warm and dry.   Neurological:      Mental Status: He is alert and oriented to person, place, and time.   Psychiatric:         Behavior: Behavior normal.     ct shows large right hydrocele    Assessment/Plan:     recurrent hydrocele??  Will place on antibiotics and mobic--fu in two weeks    3mm ureteral stone --  I expect is has passed  Problem List Items Addressed This Visit     None      Visit Diagnoses     Hydrocele in adult    -  Primary    Relevant Orders    POCT Bladder Scan (Completed)    Renal stone        Relevant Orders    POCT Urinalysis (w/Micro Option)

## 2020-12-11 LAB — URINE CULTURE, ROUTINE: NORMAL

## 2020-12-14 ENCOUNTER — TELEPHONE (OUTPATIENT)
Dept: UROLOGY | Facility: CLINIC | Age: 65
End: 2020-12-14

## 2020-12-14 NOTE — TELEPHONE ENCOUNTER
----- Message from Stanley Madera MD sent at 12/14/2020 12:01 PM CST -----  Bedrest and scrotal ambulation

## 2020-12-24 ENCOUNTER — OFFICE VISIT (OUTPATIENT)
Dept: UROLOGY | Facility: CLINIC | Age: 65
End: 2020-12-24
Payer: MEDICAID

## 2020-12-24 VITALS — DIASTOLIC BLOOD PRESSURE: 61 MMHG | HEART RATE: 108 BPM | SYSTOLIC BLOOD PRESSURE: 99 MMHG

## 2020-12-24 DIAGNOSIS — N43.3 HYDROCELE, UNSPECIFIED HYDROCELE TYPE: ICD-10-CM

## 2020-12-24 DIAGNOSIS — R39.198 DIFFICULTY VOIDING: Primary | ICD-10-CM

## 2020-12-24 LAB — POC RESIDUAL URINE VOLUME: 161 ML (ref 0–100)

## 2020-12-24 PROCEDURE — 51798 POCT BLADDER SCAN: ICD-10-PCS | Mod: S$GLB,,, | Performed by: NURSE PRACTITIONER

## 2020-12-24 PROCEDURE — 51798 US URINE CAPACITY MEASURE: CPT | Mod: S$GLB,,, | Performed by: NURSE PRACTITIONER

## 2020-12-24 PROCEDURE — 99214 OFFICE O/P EST MOD 30 MIN: CPT | Mod: S$GLB,,, | Performed by: NURSE PRACTITIONER

## 2020-12-24 PROCEDURE — 99214 PR OFFICE/OUTPT VISIT, EST, LEVL IV, 30-39 MIN: ICD-10-PCS | Mod: S$GLB,,, | Performed by: NURSE PRACTITIONER

## 2020-12-24 RX ORDER — TAMSULOSIN HYDROCHLORIDE 0.4 MG/1
0.4 CAPSULE ORAL DAILY
Qty: 30 CAPSULE | Refills: 11 | Status: SHIPPED | OUTPATIENT
Start: 2020-12-24 | End: 2021-03-08 | Stop reason: SDUPTHER

## 2020-12-24 NOTE — PROGRESS NOTES
Patient seen examined and chart reviewed case discussed with Jose.  Patient with liver failure and ascites--patient is overdue for a tap.  Will start patient on flomax and have fu in 3 weeks

## 2020-12-24 NOTE — PROGRESS NOTES
Subjective:       Patient ID: Ever Arana Sr. is a 65 y.o. male.    Chief Complaint: Follow-up      HPI: 65-year-old male, patient Dr. Madera, presents for 2 week follow-up.  Patient has a hydrocelectomy in June 2020.  Patient seen 2 weeks ago with complaint testicular swelling and pain.  Patient was put on anti-inflammatories and antibiotics.  Patient presents today stating he has no change in his symptoms.  Patient states he is having pain to his scrotum rated 10/10 and described as sharp.  Patient also states that he is having difficulty voiding.  Patient denies any blood in urine.  Denies any flank pain.  Denies any fever.  Denies any dysuria.  Denies any significant weight loss.  Patient does have a history of ascites and liver disease.  Patient states he was emergency room at [facility].  We will try to obtain records, however from the patient's description he may have hernia or fluid from ascites drain to scrotum.    No other urinary complaints.  All other health problems appear stable at this time.       Past Medical History:   Past Medical History:   Diagnosis Date    ED (erectile dysfunction)     GERD (gastroesophageal reflux disease)     History of hematuria     Hypertension     Reflex sympathetic dystrophy of both lower extremities     Reflex sympathetic dystrophy of both lower extremities     Renal stone     Right hydrocele        Past Surgical Historical:   Past Surgical History:   Procedure Laterality Date    COLONOSCOPY      ESOPHAGOGASTRODUODENOSCOPY      REPAIR OF INGUINAL HERNIA WITH HYDROCELECTOMY Right 06/11/2020        Medications:   Medication List with Changes/Refills   New Medications    TAMSULOSIN (FLOMAX) 0.4 MG CAP    Take 1 capsule (0.4 mg total) by mouth once daily.   Current Medications    AMLODIPINE (NORVASC) 10 MG TABLET    Take 1 tablet (10 mg total) by mouth once daily.    AMOXICILLIN-CLAVULANATE 875-125MG (AUGMENTIN) 875-125 MG PER TABLET         CLOTRIMAZOLE-BETAMETHASONE 1-0.05% (LOTRISONE) CREAM    Apply 45 g topically 2 (two) times daily.    DICLOFENAC SODIUM (VOLTAREN) 1 % GEL    Apply 4 g topically 4 (four) times daily.    FUROSEMIDE (LASIX) 20 MG TABLET    Take 1 tablet (20 mg total) by mouth 2 (two) times daily. for 2 doses    HYDROCHLOROTHIAZIDE (MICROZIDE) 12.5 MG CAPSULE    hydrochlorothiazide 12.5 mg capsule    KETOCONAZOLE (NIZORAL) 2 % SHAMPOO    Apply topically twice a week. 3 times weekly    LACTULOSE (CHRONULAC) 10 GRAM/15 ML SOLUTION    Take 10 g by mouth. 30ml per day    MYRBETRIQ 25 MG TB24 ER TABLET    Take 25 mg by mouth Daily. Take 2 tabs (50mg total) by mouth once daily.    PANTOPRAZOLE (PROTONIX) 40 MG TABLET    Take 1 tablet (40 mg total) by mouth once daily.    PREGABALIN (LYRICA) 75 MG CAPSULE    Take 1 capsule (75 mg total) by mouth 2 (two) times daily.    SILDENAFIL (REVATIO) 20 MG TAB    TK 1 TO 5 TS PO PRN. NO MORE THAN 5 TS IN 24 H        Past Social History:   Social History     Socioeconomic History    Marital status: Single     Spouse name: Not on file    Number of children: Not on file    Years of education: Not on file    Highest education level: Not on file   Occupational History    Not on file   Social Needs    Financial resource strain: Not on file    Food insecurity     Worry: Not on file     Inability: Not on file    Transportation needs     Medical: Not on file     Non-medical: Not on file   Tobacco Use    Smoking status: Former Smoker     Packs/day: 3.00     Years: 10.00     Pack years: 30.00   Substance and Sexual Activity    Alcohol use: Never     Frequency: Never    Drug use: Never    Sexual activity: Not on file   Lifestyle    Physical activity     Days per week: Not on file     Minutes per session: Not on file    Stress: Not on file   Relationships    Social connections     Talks on phone: Not on file     Gets together: Not on file     Attends Orthodox service: Not on file     Active member  of club or organization: Not on file     Attends meetings of clubs or organizations: Not on file     Relationship status: Not on file   Other Topics Concern    Not on file   Social History Narrative    Not on file       Allergies: Review of patient's allergies indicates:  No Known Allergies     Family History:   Family History   Problem Relation Age of Onset    Cancer Mother     Cancer Father     Cancer Brother         Review of Systems:  Review of Systems   Constitutional: Negative for activity change and appetite change.   HENT: Negative for congestion and dental problem.    Eyes: Negative for visual disturbance.   Respiratory: Negative for chest tightness and shortness of breath.    Cardiovascular: Negative for chest pain.   Gastrointestinal: Negative for abdominal distention and abdominal pain.   Genitourinary: Positive for difficulty urinating, scrotal swelling and testicular pain. Negative for decreased urine volume, discharge, dysuria, enuresis, flank pain, frequency, genital sores, hematuria, penile pain, penile swelling and urgency.   Musculoskeletal: Negative for back pain and neck pain.   Skin: Negative for color change.   Neurological: Negative for dizziness.   Hematological: Negative for adenopathy.   Psychiatric/Behavioral: Negative for agitation, behavioral problems and confusion.       Physical Exam:  Physical Exam  Vitals signs and nursing note reviewed.   Constitutional:       Appearance: He is well-developed.   HENT:      Head: Normocephalic.   Eyes:      Pupils: Pupils are equal, round, and reactive to light.   Neck:      Musculoskeletal: Normal range of motion and neck supple.   Cardiovascular:      Rate and Rhythm: Normal rate and regular rhythm.      Heart sounds: Normal heart sounds.   Pulmonary:      Effort: Pulmonary effort is normal.      Breath sounds: Normal breath sounds.   Abdominal:      General: Bowel sounds are normal.      Palpations: Abdomen is soft.   Genitourinary:      Comments: Noted edema to scrotum.  Tenderness with palpation.  Musculoskeletal: Normal range of motion.   Skin:     General: Skin is warm and dry.   Neurological:      Mental Status: He is alert and oriented to person, place, and time.   Psychiatric:         Behavior: Behavior normal.       Urinalysis:  Normal  Bladder scan:  161 cc    Assessment/Plan:   1.  Hydrocele:  Patient advised to follow-up with his doctor for abdominal tap.    2.  Difficulty voiding:  Will start the patient on Flomax.    Will plan follow-up in 3 weeks, sooner if needed.    Problem List Items Addressed This Visit     None      Visit Diagnoses     Difficulty voiding    -  Primary    Relevant Medications    tamsulosin (FLOMAX) 0.4 mg Cap    Other Relevant Orders    POCT Urinalysis (w/Micro Option)    POCT Bladder Scan    Hydrocele, unspecified hydrocele type        Relevant Orders    POCT Urinalysis (w/Micro Option)

## 2021-01-15 ENCOUNTER — OFFICE VISIT (OUTPATIENT)
Dept: UROLOGY | Facility: CLINIC | Age: 66
End: 2021-01-15
Payer: MEDICAID

## 2021-01-15 VITALS
RESPIRATION RATE: 18 BRPM | HEART RATE: 114 BPM | DIASTOLIC BLOOD PRESSURE: 71 MMHG | SYSTOLIC BLOOD PRESSURE: 119 MMHG | HEIGHT: 65 IN | BODY MASS INDEX: 31.49 KG/M2 | WEIGHT: 189 LBS

## 2021-01-15 DIAGNOSIS — N43.3 HYDROCELE, UNSPECIFIED HYDROCELE TYPE: Primary | ICD-10-CM

## 2021-01-15 DIAGNOSIS — R39.198 DIFFICULTY VOIDING: ICD-10-CM

## 2021-01-15 LAB — POC RESIDUAL URINE VOLUME: 177 ML (ref 0–100)

## 2021-01-15 PROCEDURE — 99213 OFFICE O/P EST LOW 20 MIN: CPT | Mod: S$GLB,,, | Performed by: NURSE PRACTITIONER

## 2021-01-15 PROCEDURE — 51798 US URINE CAPACITY MEASURE: CPT | Mod: S$GLB,,, | Performed by: NURSE PRACTITIONER

## 2021-01-15 PROCEDURE — 51798 POCT BLADDER SCAN: ICD-10-PCS | Mod: S$GLB,,, | Performed by: NURSE PRACTITIONER

## 2021-01-15 PROCEDURE — 99213 PR OFFICE/OUTPT VISIT, EST, LEVL III, 20-29 MIN: ICD-10-PCS | Mod: S$GLB,,, | Performed by: NURSE PRACTITIONER

## 2021-01-15 RX ORDER — HYDROCODONE BITARTRATE AND ACETAMINOPHEN 5; 325 MG/1; MG/1
1 TABLET ORAL EVERY 6 HOURS PRN
COMMUNITY
Start: 2020-12-19

## 2021-01-15 RX ORDER — FAMOTIDINE 20 MG/1
TABLET, FILM COATED ORAL
COMMUNITY
Start: 2020-12-26

## 2021-01-15 RX ORDER — MELOXICAM 15 MG/1
15 TABLET ORAL DAILY
COMMUNITY
Start: 2020-12-09

## 2021-01-25 ENCOUNTER — TELEPHONE (OUTPATIENT)
Dept: UROLOGY | Facility: CLINIC | Age: 66
End: 2021-01-25

## 2021-02-08 ENCOUNTER — OFFICE VISIT (OUTPATIENT)
Dept: UROLOGY | Facility: CLINIC | Age: 66
End: 2021-02-08
Payer: MEDICAID

## 2021-02-08 DIAGNOSIS — N43.3 HYDROCELE, UNSPECIFIED HYDROCELE TYPE: Primary | ICD-10-CM

## 2021-02-08 DIAGNOSIS — R39.198 DIFFICULTY VOIDING: ICD-10-CM

## 2021-02-08 LAB — POC RESIDUAL URINE VOLUME: 90 ML (ref 0–100)

## 2021-02-08 PROCEDURE — 99213 PR OFFICE/OUTPT VISIT, EST, LEVL III, 20-29 MIN: ICD-10-PCS | Mod: S$GLB,,, | Performed by: NURSE PRACTITIONER

## 2021-02-08 PROCEDURE — 99213 OFFICE O/P EST LOW 20 MIN: CPT | Mod: S$GLB,,, | Performed by: NURSE PRACTITIONER

## 2021-02-08 PROCEDURE — 51798 US URINE CAPACITY MEASURE: CPT | Mod: S$GLB,,, | Performed by: NURSE PRACTITIONER

## 2021-02-08 PROCEDURE — 51798 POCT BLADDER SCAN: ICD-10-PCS | Mod: S$GLB,,, | Performed by: NURSE PRACTITIONER

## 2021-03-08 ENCOUNTER — OFFICE VISIT (OUTPATIENT)
Dept: UROLOGY | Facility: CLINIC | Age: 66
End: 2021-03-08
Payer: MEDICARE

## 2021-03-08 ENCOUNTER — TELEPHONE (OUTPATIENT)
Dept: UROLOGY | Facility: CLINIC | Age: 66
End: 2021-03-08

## 2021-03-08 VITALS — RESPIRATION RATE: 18 BRPM | DIASTOLIC BLOOD PRESSURE: 78 MMHG | SYSTOLIC BLOOD PRESSURE: 110 MMHG

## 2021-03-08 DIAGNOSIS — R39.198 DIFFICULTY VOIDING: ICD-10-CM

## 2021-03-08 DIAGNOSIS — N13.8 BPH WITH URINARY OBSTRUCTION: Primary | ICD-10-CM

## 2021-03-08 DIAGNOSIS — N40.1 BPH WITH URINARY OBSTRUCTION: Primary | ICD-10-CM

## 2021-03-08 DIAGNOSIS — R14.0 ABDOMINAL DISTENSION (GASEOUS): ICD-10-CM

## 2021-03-08 LAB — POC RESIDUAL URINE VOLUME: 123 ML (ref 0–100)

## 2021-03-08 PROCEDURE — 51798 POCT BLADDER SCAN: ICD-10-PCS | Mod: S$GLB,,, | Performed by: NURSE PRACTITIONER

## 2021-03-08 PROCEDURE — 99214 PR OFFICE/OUTPT VISIT, EST, LEVL IV, 30-39 MIN: ICD-10-PCS | Mod: S$GLB,,, | Performed by: NURSE PRACTITIONER

## 2021-03-08 PROCEDURE — 99214 OFFICE O/P EST MOD 30 MIN: CPT | Mod: S$GLB,,, | Performed by: NURSE PRACTITIONER

## 2021-03-08 PROCEDURE — 51798 US URINE CAPACITY MEASURE: CPT | Mod: S$GLB,,, | Performed by: NURSE PRACTITIONER

## 2021-03-08 RX ORDER — TAMSULOSIN HYDROCHLORIDE 0.4 MG/1
0.8 CAPSULE ORAL DAILY
Qty: 60 CAPSULE | Refills: 11 | Status: SHIPPED | OUTPATIENT
Start: 2021-03-08 | End: 2021-09-16 | Stop reason: SDUPTHER

## 2021-03-08 RX ORDER — TIZANIDINE 4 MG/1
TABLET ORAL
COMMUNITY
Start: 2021-02-17

## 2021-03-08 RX ORDER — TAMSULOSIN HYDROCHLORIDE 0.4 MG/1
0.4 CAPSULE ORAL DAILY
Qty: 30 CAPSULE | Refills: 11 | Status: SHIPPED | OUTPATIENT
Start: 2021-03-08 | End: 2021-03-08

## 2021-03-08 RX ORDER — ONDANSETRON HYDROCHLORIDE 8 MG/1
8 TABLET, FILM COATED ORAL 3 TIMES DAILY PRN
COMMUNITY
Start: 2021-02-14

## 2021-04-21 ENCOUNTER — TELEPHONE (OUTPATIENT)
Dept: TRANSPLANT | Facility: CLINIC | Age: 66
End: 2021-04-21

## 2021-04-27 ENCOUNTER — TELEPHONE (OUTPATIENT)
Dept: TRANSPLANT | Facility: CLINIC | Age: 66
End: 2021-04-27

## 2021-05-07 ENCOUNTER — TELEPHONE (OUTPATIENT)
Dept: TRANSPLANT | Facility: CLINIC | Age: 66
End: 2021-05-07

## 2021-05-13 ENCOUNTER — TELEPHONE (OUTPATIENT)
Dept: TRANSPLANT | Facility: CLINIC | Age: 66
End: 2021-05-13

## 2021-06-01 ENCOUNTER — TELEPHONE (OUTPATIENT)
Dept: TRANSPLANT | Facility: CLINIC | Age: 66
End: 2021-06-01

## 2021-06-09 ENCOUNTER — TELEPHONE (OUTPATIENT)
Dept: TRANSPLANT | Facility: CLINIC | Age: 66
End: 2021-06-09

## 2021-06-10 ENCOUNTER — TELEPHONE (OUTPATIENT)
Dept: TRANSPLANT | Facility: CLINIC | Age: 66
End: 2021-06-10

## 2021-06-11 ENCOUNTER — TELEPHONE (OUTPATIENT)
Dept: TRANSPLANT | Facility: CLINIC | Age: 66
End: 2021-06-11

## 2021-06-30 ENCOUNTER — TELEPHONE (OUTPATIENT)
Dept: TRANSPLANT | Facility: CLINIC | Age: 66
End: 2021-06-30

## 2021-07-08 ENCOUNTER — TELEPHONE (OUTPATIENT)
Dept: TRANSPLANT | Facility: CLINIC | Age: 66
End: 2021-07-08

## 2021-09-16 ENCOUNTER — OFFICE VISIT (OUTPATIENT)
Dept: UROLOGY | Facility: CLINIC | Age: 66
End: 2021-09-16
Payer: MEDICARE

## 2021-09-16 VITALS
HEIGHT: 65 IN | WEIGHT: 189 LBS | DIASTOLIC BLOOD PRESSURE: 86 MMHG | BODY MASS INDEX: 31.49 KG/M2 | SYSTOLIC BLOOD PRESSURE: 160 MMHG | RESPIRATION RATE: 18 BRPM | HEART RATE: 79 BPM

## 2021-09-16 DIAGNOSIS — N13.8 BPH WITH URINARY OBSTRUCTION: Primary | ICD-10-CM

## 2021-09-16 DIAGNOSIS — N40.1 BPH WITH URINARY OBSTRUCTION: Primary | ICD-10-CM

## 2021-09-16 LAB
POC RESIDUAL URINE VOLUME: 10 ML (ref 0–100)
PSA, DIAGNOSTIC: 0.74 NG/ML (ref 0–4)

## 2021-09-16 PROCEDURE — 99213 OFFICE O/P EST LOW 20 MIN: CPT | Mod: S$GLB,,, | Performed by: NURSE PRACTITIONER

## 2021-09-16 PROCEDURE — 99213 PR OFFICE/OUTPT VISIT, EST, LEVL III, 20-29 MIN: ICD-10-PCS | Mod: S$GLB,,, | Performed by: NURSE PRACTITIONER

## 2021-09-16 PROCEDURE — 51798 US URINE CAPACITY MEASURE: CPT | Mod: S$GLB,,, | Performed by: NURSE PRACTITIONER

## 2021-09-16 PROCEDURE — 51798 POCT BLADDER SCAN: ICD-10-PCS | Mod: S$GLB,,, | Performed by: NURSE PRACTITIONER

## 2021-09-16 RX ORDER — TAMSULOSIN HYDROCHLORIDE 0.4 MG/1
0.4 CAPSULE ORAL DAILY
Qty: 30 CAPSULE | Refills: 11 | Status: SHIPPED | OUTPATIENT
Start: 2021-09-16 | End: 2022-09-16

## 2021-09-17 ENCOUNTER — TELEPHONE (OUTPATIENT)
Dept: UROLOGY | Facility: CLINIC | Age: 66
End: 2021-09-17

## 2022-04-12 ENCOUNTER — HISTORICAL (OUTPATIENT)
Dept: ADMINISTRATIVE | Facility: HOSPITAL | Age: 67
End: 2022-04-12
Payer: MEDICAID

## 2022-04-30 VITALS
WEIGHT: 177 LBS | SYSTOLIC BLOOD PRESSURE: 149 MMHG | BODY MASS INDEX: 29.49 KG/M2 | HEIGHT: 65 IN | DIASTOLIC BLOOD PRESSURE: 78 MMHG

## 2022-05-05 NOTE — HISTORICAL [facility] CERNER
This is a historical note converted from No. Formatting and pictures may have been removed.  Please reference No for original formatting and attached multimedia. Chief Complaint  Entire Body Pain  History of Present Illness  -64?yo?male?non-smoker?presents to ortho clinic for?initial visit?for?right?shoulder pain.? Patient points to ?lateral?shoulder. Patient dominate hand: right  Today:? New patient referral for shoulder pain right shoulder.? Patient states he believes it is RDS that he was DX with RDS approximately 12 yrs ago  Onset: ?Insidious over years??progressively worsening  Current pain level:? 10/10??without pain medication. Quality described as??sharp?.? Patient?denies?use of pain medication today.?  Medications r/t complaint: Patient reports using?OTC?medications in past including:? Tylenol/ibuprofen.? Currently taking?same??PRN?pain with?mild?relief.?  Modifying Factors: ?Worse with/after activity;Improved with rest;??Stiffness after immobilization??Stiffness improved with less than 30 minutes of activity; ?Increased Pain with upward/ overhead movements and with extended use  Injury:?Denies  Previous treatment: HEP denies ; RX NSAIDs, PT??denies?, CSI in past report I nearly   Associated Symptoms:?No Crepitus/Grinding; ?Numbness/ tingling?bilateral hands and all over my body;??No swelling;?No skin changes;?Weakness of right UE?and every part of my body;?Mild decrease in ROM;??difficulty sleeping at night s/t pain  Activity:?Sedentary, full ADLs;?Pain interferes with function/daily activity (mild)  PMH:? denies CVD; denies DM ;denies ?RA;denies ?gout; denies ?RX anticoagulants; intolerance to NSAIDs s/t GI issues approximately x1 tear ago; denies intolerance to NSAIDs s/t kidney disease  Family History:?Family history of arthritis  PCP:??DINO Franz?, referral source same  Employment:?Patient reports he worked as a  currently unemployed and on  disability.  Note:?Patient reports extensive history of pain all over my body and states he has RDS.  ?   Patient needed to be re-directed throughout exam.? Patient requesting pain medications and to be put in hospital so I can be knocked out for one day.? Patient educated that no narcotics will be RX per ortho.? Patient not pleased and continued to insist he need pain medication for ride home to [place].  Review of Systems  Constitutional:No fever;No chills;No weight loss  CV:No swelling;No edema  GI:No urinary retention;No urinary incontinence  :No fecal incontinence  Skin:No rash;No wound  Neuro:No numbness/tingling;No weakness;No saddle anesthesia  MSK: As above  Psych:No depression;No anxiety  Heme/Lymph:No easy bruising;No easy bleeding;No lymphadenopathy  Immuno:No MRSA history  Physical Exam  Vitals & Measurements  BP:?149/78?  HT:?165?cm? WT:?80.3?kg? BMI:?29.49?  MSK:RIGHT?SHOULDER  General: No apparent distress;?well-nourished  Inspection:?Guarding/self-imposed immobilization of??bilateral  arm;??No skin abnormalities;??Normal?alignment;??No swelling;?No?erythema;?No?bruising;?No atrophy?/ deconditioning noted  Palpation:?tenderness diffuse?to bilateral?shoulders with?palpation; ?Crepitus:?Negative?  ROM:?  ?????unable to test, patient reports s/t pain  Special Tests:  ?????unable to test, patient reports s/t pain  Neurovascular:?Intact; 2+?distal pulse, sensation intact to light touch  Neuro/Psych: Awake, Alert, Oriented; Normal mood and affect  Lymphatic: No LAD  Skin and Soft Tissue: No bruising, No ecchymosis; No rash; Skin intact  Cardiovascular: RRR  Assessment/Plan  1.?Chronic pain  ?1.? Discussed with patient diagnosis and treatment recommendations.? Handout given.  2.? Imaging:?Radiological studies ordered, reviewed,?and independently interpreted by me; Discussed with patient  3.? Treatment plan: Conservative treatment to include? ?oral glucosamine 1500 mg/day; Topical capsaicin as needed;  rest;?Hot or cold therapy; Adequate vitamin C/D intake  4.? Procedure:?Patient is not a candidate for CSI or VS? Patient reports I nearly  last time they gave me an injection  5.? Activity:?Activity as tolerated; HEP to included aerobic conditioning with non-painful activity and ROM/STG exercises; activity modification as necessary; eliminate overhead reaching and/or forceful push/pull and use weighted pendulum stretching for 5 minutes after?heat application daily  6.? Therapy:Formal PT/OT ordered?  7.? Medication:?continue medications as RX per PCP  8.? RTC:?PRN; call if any issues  9.? Additional work-up:?None  Ordered:  Clinic Follow-up PRN  Office/Outpatient Visit Level 5 Established [address] PC  ?  Orders:  XR Shoulder Right Minimum 2 Views   Problem List/Past Medical History  Ongoing  Chronic pain  Historical  No qualifying data  Medications  Fergon 240 mg (27 mg elemental iron) oral tablet, 240 mg= 1 tab(s), Oral, TID  lisinopril 10 mg oral tablet, 10 mg= 1 tab(s), Oral, Daily  oxybutynin 5 mg oral tablet, 5 mg= 1 tab(s), Oral, BID  Pantoprazole 40 mg ORAL EC-Tablet, 40 mg= 1 tab(s), Oral, BID  tamsulosin 0.4 mg oral capsule, 0.4 mg= 1 cap(s), Oral, Daily  Allergies  No Known Medication Allergies  Social History  Alcohol  Past, 2019  Sexual  Sexual orientation: Straight or heterosexual. Gender Identity Identifies as male., 2019  Substance Abuse  Never, 2019  Tobacco  Never (less than 100 in lifetime), N/A, 2019  Health Maintenance  Health Maintenance  ???Pending?(in the next year)  ??? ??Due?  ??? ? ? ?ADL Screening due??19??and every 1??year(s)  ??? ? ? ?Alcohol Misuse Screening due??19??and every 1??year(s)  ??? ? ? ?Aspirin Therapy for CVD Prevention due??19??and every 1??year(s)  ??? ? ? ?Colorectal Screening due??19??and every?  ??? ? ? ?Diabetes Screening due??19??and every?  ??? ? ? ?Lipid Screening due??19??and every?  ??? ? ? ?Tetanus  Vaccine due??03/28/19??and every 10??year(s)  ??? ? ? ?Zoster Vaccine due??03/28/19??and every 100??year(s)  ??? ??Due In Future?  ??? ? ? ?Blood Pressure Screening not due until??03/27/20??and every 1??year(s)  ??? ? ? ?Body Mass Index Check not due until??03/27/20??and every 1??year(s)  ??? ? ? ?Depression Screening not due until??03/27/20??and every 1??year(s)  ???Satisfied?(in the past 1 year)  ??? ??Satisfied?  ??? ? ? ?Blood Pressure Screening on??03/28/19.??Satisfied by Rae Tenorio RN  ??? ? ? ?Body Mass Index Check on??03/28/19.??Satisfied by Rae Tenorio RN  ??? ? ? ?Depression Screening on??03/28/19.??Satisfied by Rae Tenorio RN  ??? ? ? ?Obesity Screening on??03/28/19.??Satisfied by Rae Tenorio RN  ?  ?  Diagnostic Results  XR right shoulder obtained 3/28/19; no acute findings; awaiting radiologist findings.